# Patient Record
Sex: MALE | Race: WHITE | Employment: OTHER | URBAN - METROPOLITAN AREA
[De-identification: names, ages, dates, MRNs, and addresses within clinical notes are randomized per-mention and may not be internally consistent; named-entity substitution may affect disease eponyms.]

---

## 2017-06-08 ENCOUNTER — HOSPITAL ENCOUNTER (OUTPATIENT)
Dept: ULTRASOUND IMAGING | Age: 82
Discharge: HOME | End: 2017-06-08
Attending: RADIOLOGY
Payer: MEDICARE

## 2017-06-08 DIAGNOSIS — I65.29 CAROTID STENOSIS: ICD-10-CM

## 2017-06-08 PROCEDURE — 93880 EXTRACRANIAL BILAT STUDY: CPT

## 2017-09-12 ENCOUNTER — HOSPITAL ENCOUNTER (OUTPATIENT)
Dept: ULTRASOUND IMAGING | Age: 82
Discharge: HOME | End: 2017-09-12
Attending: INTERNAL MEDICINE
Payer: MEDICARE

## 2017-09-12 ENCOUNTER — HOSPITAL ENCOUNTER (OUTPATIENT)
Dept: MRI IMAGING | Age: 82
Discharge: HOME | End: 2017-09-12
Attending: INTERNAL MEDICINE
Payer: MEDICARE

## 2017-09-12 DIAGNOSIS — M54.41 CHRONIC RIGHT-SIDED LOW BACK PAIN WITH RIGHT-SIDED SCIATICA: ICD-10-CM

## 2017-09-12 DIAGNOSIS — M79.604 PAIN OF RIGHT LOWER EXTREMITY: ICD-10-CM

## 2017-09-12 DIAGNOSIS — G89.29 CHRONIC RIGHT-SIDED LOW BACK PAIN WITH RIGHT-SIDED SCIATICA: ICD-10-CM

## 2017-09-12 PROCEDURE — 93922 UPR/L XTREMITY ART 2 LEVELS: CPT

## 2017-09-12 PROCEDURE — 72148 MRI LUMBAR SPINE W/O DYE: CPT

## 2017-09-13 NOTE — PROGRESS NOTES
Pt notified that Refer to Ortho for low back pain. Patient requesting Dr. Christopher Garibaldi.  Referral ordered

## 2017-09-15 ENCOUNTER — HOSPITAL ENCOUNTER (OUTPATIENT)
Dept: ULTRASOUND IMAGING | Age: 82
Discharge: HOME | End: 2017-09-15
Attending: INTERNAL MEDICINE
Payer: MEDICARE

## 2017-09-15 DIAGNOSIS — I73.9 CLAUDICATION (HCC): ICD-10-CM

## 2017-09-15 PROCEDURE — 93926 LOWER EXTREMITY STUDY: CPT

## 2018-01-22 ENCOUNTER — HOSPITAL ENCOUNTER (OUTPATIENT)
Dept: LAB | Age: 83
Discharge: HOME | End: 2018-01-22
Payer: MEDICARE

## 2018-01-22 DIAGNOSIS — I50.22 CHRONIC SYSTOLIC HEART FAILURE (HCC): ICD-10-CM

## 2018-01-22 DIAGNOSIS — R53.83 FATIGUE, UNSPECIFIED TYPE: ICD-10-CM

## 2018-01-22 LAB
ALBUMIN SERPL-MCNC: 3.8 G/DL (ref 3.2–4.6)
ALBUMIN/GLOB SERPL: 1.2 {RATIO}
ALP SERPL-CCNC: 49 U/L (ref 50–136)
ALT SERPL-CCNC: 17 U/L (ref 12–65)
ANION GAP SERPL CALC-SCNC: 8 MMOL/L
AST SERPL-CCNC: 14 U/L (ref 15–37)
BASOPHILS # BLD: 0 K/UL (ref 0–0.2)
BASOPHILS NFR BLD: 1 % (ref 0–2)
BILIRUB SERPL-MCNC: 0.4 MG/DL (ref 0.2–1.1)
BUN SERPL-MCNC: 26 MG/DL (ref 8–23)
CALCIUM SERPL-MCNC: 8.7 MG/DL (ref 8.3–10.4)
CHLORIDE SERPL-SCNC: 109 MMOL/L (ref 98–107)
CO2 SERPL-SCNC: 23 MMOL/L (ref 21–32)
CREAT SERPL-MCNC: 1.6 MG/DL (ref 0.8–1.5)
DIFFERENTIAL METHOD BLD: ABNORMAL
EOSINOPHIL # BLD: 0.5 K/UL (ref 0–0.8)
EOSINOPHIL NFR BLD: 6 % (ref 0.5–7.8)
ERYTHROCYTE [DISTWIDTH] IN BLOOD BY AUTOMATED COUNT: 13.2 % (ref 11.9–14.6)
GLOBULIN SER CALC-MCNC: 3.3 G/DL
GLUCOSE SERPL-MCNC: 86 MG/DL (ref 65–100)
HCT VFR BLD AUTO: 34.8 % (ref 41.1–50.3)
HGB BLD-MCNC: 12 G/DL (ref 13.6–17.2)
LYMPHOCYTES # BLD: 1.4 K/UL (ref 0.5–4.6)
LYMPHOCYTES NFR BLD: 19 % (ref 13–44)
MCH RBC QN AUTO: 32.4 PG (ref 26.1–32.9)
MCHC RBC AUTO-ENTMCNC: 34.5 G/DL (ref 31.4–35)
MCV RBC AUTO: 94.1 FL (ref 79.6–97.8)
MONOCYTES # BLD: 0.9 K/UL (ref 0.1–1.3)
MONOCYTES NFR BLD: 11 % (ref 4–12)
NEUTS SEG # BLD: 4.8 K/UL (ref 1.7–8.2)
NEUTS SEG NFR BLD: 63 % (ref 43–78)
PLATELET # BLD AUTO: 242 K/UL (ref 150–450)
PMV BLD AUTO: 9.9 FL (ref 10.8–14.1)
POTASSIUM SERPL-SCNC: 4.3 MMOL/L (ref 3.5–5.1)
PROT SERPL-MCNC: 7.1 G/DL (ref 6.3–8.2)
RBC # BLD AUTO: 3.7 M/UL (ref 4.23–5.67)
SODIUM SERPL-SCNC: 140 MMOL/L (ref 136–145)
TSH SERPL DL<=0.005 MIU/L-ACNC: 2.28 UIU/ML (ref 0.36–3.74)
WBC # BLD AUTO: 7.6 K/UL (ref 4.3–11.1)

## 2018-01-22 PROCEDURE — 36415 COLL VENOUS BLD VENIPUNCTURE: CPT | Performed by: INTERNAL MEDICINE

## 2018-01-22 PROCEDURE — 84443 ASSAY THYROID STIM HORMONE: CPT | Performed by: INTERNAL MEDICINE

## 2018-01-22 PROCEDURE — 85025 COMPLETE CBC W/AUTO DIFF WBC: CPT | Performed by: INTERNAL MEDICINE

## 2018-01-22 PROCEDURE — 80053 COMPREHEN METABOLIC PANEL: CPT | Performed by: INTERNAL MEDICINE

## 2018-05-31 ENCOUNTER — HOSPITAL ENCOUNTER (OUTPATIENT)
Dept: ULTRASOUND IMAGING | Age: 83
Discharge: HOME | End: 2018-05-31
Attending: RADIOLOGY
Payer: MEDICARE

## 2018-05-31 DIAGNOSIS — I65.29 CAROTID STENOSIS: ICD-10-CM

## 2018-05-31 PROCEDURE — 93880 EXTRACRANIAL BILAT STUDY: CPT

## 2018-07-18 PROBLEM — I48.0 PAROXYSMAL ATRIAL FIBRILLATION (HCC): Status: ACTIVE | Noted: 2018-07-18

## 2018-11-05 PROBLEM — I48.19 PERSISTENT ATRIAL FIBRILLATION (HCC): Status: ACTIVE | Noted: 2018-07-18

## 2019-02-07 ENCOUNTER — HOSPITAL ENCOUNTER (OUTPATIENT)
Dept: MRI IMAGING | Age: 84
Discharge: HOME | End: 2019-02-07
Attending: INTERNAL MEDICINE
Payer: MEDICARE

## 2019-02-07 ENCOUNTER — HOSPITAL ENCOUNTER (EMERGENCY)
Age: 84
Discharge: HOME | End: 2019-02-07
Attending: STUDENT IN AN ORGANIZED HEALTH CARE EDUCATION/TRAINING PROGRAM
Payer: MEDICARE

## 2019-02-07 VITALS
OXYGEN SATURATION: 98 % | WEIGHT: 165 LBS | HEIGHT: 70 IN | SYSTOLIC BLOOD PRESSURE: 198 MMHG | TEMPERATURE: 97.8 F | DIASTOLIC BLOOD PRESSURE: 73 MMHG | BODY MASS INDEX: 23.62 KG/M2 | RESPIRATION RATE: 18 BRPM | HEART RATE: 71 BPM

## 2019-02-07 DIAGNOSIS — T78.40XA ALLERGIC REACTION, INITIAL ENCOUNTER: Primary | ICD-10-CM

## 2019-02-07 DIAGNOSIS — M54.40 LOW BACK PAIN WITH SCIATICA, SCIATICA LATERALITY UNSPECIFIED, UNSPECIFIED BACK PAIN LATERALITY, UNSPECIFIED CHRONICITY: ICD-10-CM

## 2019-02-07 PROCEDURE — 99283 EMERGENCY DEPT VISIT LOW MDM: CPT | Performed by: STUDENT IN AN ORGANIZED HEALTH CARE EDUCATION/TRAINING PROGRAM

## 2019-02-07 PROCEDURE — 74011000250 HC RX REV CODE- 250: Performed by: STUDENT IN AN ORGANIZED HEALTH CARE EDUCATION/TRAINING PROGRAM

## 2019-02-07 PROCEDURE — 72158 MRI LUMBAR SPINE W/O & W/DYE: CPT

## 2019-02-07 PROCEDURE — 74011250636 HC RX REV CODE- 250/636: Performed by: STUDENT IN AN ORGANIZED HEALTH CARE EDUCATION/TRAINING PROGRAM

## 2019-02-07 PROCEDURE — 74011250636 HC RX REV CODE- 250/636

## 2019-02-07 PROCEDURE — 93005 ELECTROCARDIOGRAM TRACING: CPT | Performed by: STUDENT IN AN ORGANIZED HEALTH CARE EDUCATION/TRAINING PROGRAM

## 2019-02-07 PROCEDURE — A9575 INJ GADOTERATE MEGLUMI 0.1ML: HCPCS

## 2019-02-07 PROCEDURE — 96375 TX/PRO/DX INJ NEW DRUG ADDON: CPT | Performed by: STUDENT IN AN ORGANIZED HEALTH CARE EDUCATION/TRAINING PROGRAM

## 2019-02-07 PROCEDURE — 96374 THER/PROPH/DIAG INJ IV PUSH: CPT | Performed by: STUDENT IN AN ORGANIZED HEALTH CARE EDUCATION/TRAINING PROGRAM

## 2019-02-07 RX ORDER — PREDNISONE 20 MG/1
40 TABLET ORAL DAILY
Qty: 8 TAB | Refills: 0 | Status: SHIPPED | OUTPATIENT
Start: 2019-02-07 | End: 2019-02-11

## 2019-02-07 RX ORDER — GADOTERATE MEGLUMINE 376.9 MG/ML
15 INJECTION INTRAVENOUS
Status: COMPLETED | OUTPATIENT
Start: 2019-02-07 | End: 2019-02-07

## 2019-02-07 RX ORDER — FAMOTIDINE 10 MG/ML
40 INJECTION INTRAVENOUS
Status: DISCONTINUED | OUTPATIENT
Start: 2019-02-07 | End: 2019-02-07 | Stop reason: SDUPTHER

## 2019-02-07 RX ORDER — SODIUM CHLORIDE 0.9 % (FLUSH) 0.9 %
10 SYRINGE (ML) INJECTION
Status: COMPLETED | OUTPATIENT
Start: 2019-02-07 | End: 2019-02-07

## 2019-02-07 RX ORDER — DIPHENHYDRAMINE HCL 25 MG
25 CAPSULE ORAL
Qty: 20 CAP | Refills: 0 | Status: SHIPPED | OUTPATIENT
Start: 2019-02-07 | End: 2019-02-17

## 2019-02-07 RX ORDER — DIPHENHYDRAMINE HYDROCHLORIDE 50 MG/ML
50 INJECTION, SOLUTION INTRAMUSCULAR; INTRAVENOUS
Status: COMPLETED | OUTPATIENT
Start: 2019-02-07 | End: 2019-02-07

## 2019-02-07 RX ADMIN — METHYLPREDNISOLONE SODIUM SUCCINATE 125 MG: 125 INJECTION, POWDER, FOR SOLUTION INTRAMUSCULAR; INTRAVENOUS at 19:52

## 2019-02-07 RX ADMIN — Medication 10 ML: at 19:16

## 2019-02-07 RX ADMIN — FAMOTIDINE 40 MG: 10 INJECTION INTRAVENOUS at 19:55

## 2019-02-07 RX ADMIN — GADOTERATE MEGLUMINE 15 ML: 376.9 INJECTION INTRAVENOUS at 19:16

## 2019-02-07 RX ADMIN — DIPHENHYDRAMINE HYDROCHLORIDE 50 MG: 50 INJECTION, SOLUTION INTRAMUSCULAR; INTRAVENOUS at 19:49

## 2019-02-08 LAB
ATRIAL RATE: 64 BPM
CALCULATED R AXIS, ECG10: -56 DEGREES
CALCULATED T AXIS, ECG11: 113 DEGREES
DIAGNOSIS, 93000: NORMAL
Q-T INTERVAL, ECG07: 456 MS
QRS DURATION, ECG06: 124 MS
QTC CALCULATION (BEZET), ECG08: 502 MS
VENTRICULAR RATE, ECG03: 73 BPM

## 2019-02-08 NOTE — ED NOTES
I have reviewed discharge instructions with the patient. The patient verbalized understanding. Patient left ED via Discharge Method: ambulatory to Home with self. Opportunity for questions and clarification provided. Patient given 2 scripts. To continue your aftercare when you leave the hospital, you may receive an automated call from our care team to check in on how you are doing. This is a free service and part of our promise to provide the best care and service to meet your aftercare needs.  If you have questions, or wish to unsubscribe from this service please call [phone]. Thank you for Choosing our [facility] Emergency Department.

## 2019-02-08 NOTE — ED PROVIDER NOTES
78-year-old male patient presents after a rapid response was called to MRI. Patient received an injection of contrast prior to his study. Shortly thereafter he developed some mild shortness of breath and significant neuritis effusively across his torso and back. Patient reports multiple medication allergies  He denies nausea, vomiting, pain at this time. Denies known contrast allergy. Patient arrived slightly stable, speaking clearly in no significant respiratory distress. Past Medical History:  
Diagnosis Date  Anemia 5/21/2013  Bruit 7/13/2016  CAD (coronary artery disease) 1999 CABG---- no mi/stents--- followed by dr. Dale Phillips  Carotid artery stenosis without cerebral infarction 7/13/2016 1. Right carotid stent August 2015.  CHF (congestive heart failure) ([address]  Chronic pain x yrs  
 lower back--  Chronic systolic heart failure ([facility] [address] 7/13/2016  Coronary atherosclerosis of native coronary vessel 7/13/2016  Depression 5/21/2013  Dermatitis 5/21/2013  Edema 7/13/2016  Extremity pain 7/13/2016  GERD (gastroesophageal reflux disease) occ-- no meds  Glaucoma  HLD (hyperlipidemia)  HTN (hypertension)   
 controlled with med  Lumbar stenosis with neurogenic claudication 4/15/2015  Mild aortic stenosis by prior echocardiogram   
 echo dated 5/7/14  Mitral valve regurgitation 7/13/2016  Peripheral vascular disease; arterial  7/13/2016  Pulmonary HTN, Secondary 7/13/2016  Pulmonary nodule 2012 Seen on CXR 2012. CT showed a calcified granuloma but no nodule.  Rash 7/13/2016  Spinal stenosis of thoracic region 7/13/2016  Syncope 7/6/2016 Past Surgical History:  
Procedure Laterality Date  CARDIAC SURG PROCEDURE UNLIST  12/1998  
 5 vessel bypass  HX APPENDECTOMY  HX BACK SURGERY  08/10/2018  HX CHOLECYSTECTOMY    
 open  HX GI    
 hemmrhoidectomy  HX HERNIA REPAIR    
 
   
 Family History:  
Problem Relation Age of Onset  Stroke Father  Heart Disease Brother  No Known Problems Mother Social History Socioeconomic History  Marital status:  Spouse name: Not on file  Number of children: Not on file  Years of education: Not on file  Highest education level: Not on file Social Needs  Financial resource strain: Not on file  Food insecurity - worry: Not on file  Food insecurity - inability: Not on file  Transportation needs - medical: Not on file  Transportation needs - non-medical: Not on file Occupational History  Not on file Tobacco Use  Smoking status: Former Smoker Packs/day: 4.00 Years: 15.00 Pack years: 60.00 Last attempt to quit: 1980 Years since quittin.1  Smokeless tobacco: Never Used Substance and Sexual Activity  Alcohol use: No  
 Drug use: No  
 Sexual activity: Not on file Other Topics Concern  Not on file Social History Narrative  Not on file ALLERGIES: Lumigan [bimatoprost]; Benzalkonium; Contrast dye [iodine]; Gadolinium-containing contrast media; Neosporin [neomycin-bacitracin-polymyxin]; and Other medication Review of Systems Constitutional: Negative for chills, diaphoresis and fever. HENT: Negative for congestion, sneezing and sore throat. Eyes: Negative for visual disturbance. Respiratory: Negative for cough, chest tightness, shortness of breath and wheezing. Cardiovascular: Negative for chest pain and leg swelling. Gastrointestinal: Negative for abdominal pain, blood in stool, diarrhea, nausea and vomiting. Endocrine: Negative for polyuria. Genitourinary: Negative for difficulty urinating, dysuria, flank pain, hematuria and urgency. Musculoskeletal: Negative for back pain, myalgias, neck pain and neck stiffness. Skin: Negative for color change and rash.   
Neurological: Negative for dizziness, syncope, speech difficulty, weakness, light-headedness, numbness and headaches. Psychiatric/Behavioral: Negative for behavioral problems. All other systems reviewed and are negative. Vitals:  
 02/07/19 1946 BP: 127/68 Pulse: 74 Resp: 20 Temp: 98.1 °F (36.7 °C) SpO2: 96% Weight: 74.8 kg (165 lb) Height: 5' 10\" (1.778 m) Physical Exam  
Constitutional: He is oriented to person, place, and time. He appears well-developed and well-nourished. No distress. Elderly male patient, Alert and oriented to person place and time. No acute distress, speaks in clear, fluid sentences. Controlling secretions without difficulty. HENT:  
Head: Normocephalic and atraumatic. Right Ear: External ear normal.  
Left Ear: External ear normal.  
Nose: Nose normal.  
Eyes: EOM are normal. Pupils are equal, round, and reactive to light. Neck: Normal range of motion. Cardiovascular: Normal rate, regular rhythm, normal heart sounds and intact distal pulses. Exam reveals no gallop and no friction rub. No murmur heard. Pulmonary/Chest: Effort normal and breath sounds normal. No respiratory distress. He has no wheezes. He has no rales. He exhibits no tenderness. Well-healed sternal surgical scar overlies the chest.  Lung sounds are clear and equal bilaterally. Abdominal: Soft. He exhibits no distension and no mass. There is no tenderness. There is no rebound and no guarding. No hernia. Musculoskeletal: Normal range of motion. He exhibits no edema, tenderness or deformity. Neurological: He is alert and oriented to person, place, and time. No cranial nerve deficit. Skin: Skin is warm and dry. Rash noted. Rash is urticarial. He is not diaphoretic. Faint urticarial rash present over patient's lower back and diffusely across his abdomen. The security nature per patient report. Nursing note and vitals reviewed. MDM Number of Diagnoses or Management Options Allergic reaction, initial encounter: new and requires workup Diagnosis management comments: EKG shows no acute abnormality. Atrial fibrillation present, rate controlled. History of same. Patient reports keratitis and slight shortness of breath that is now improved. He exhibits no findings consistent with anaphylactic type reaction. He was undergoing MRI imaging secondary to chronic back pain. He feels better after Pepcid, Benadryl and Solu-Medrol. Tolerating fluids without difficulty. Speaking in clear sentences, remained vitally stabl Patient is stable for discharge. We will provide prescription for Benadryl and prednisone. Discussed reasons to return with patient who voices understanding and agreement. Voice dictation software was used during the making of this note. This software is not perfect and grammatical and other typographical errors may be present. This note has been proofread, but may still contain errors. [address] [facility]; 2/7/2019 @9:37 PM  
=================================================================== Amount and/or Complexity of Data Reviewed Independent visualization of images, tracings, or specimens: yes Risk of Complications, Morbidity, and/or Mortality Presenting problems: low Diagnostic procedures: low Management options: low Patient Progress Patient progress: stable Procedures

## 2019-02-08 NOTE — ED TRIAGE NOTES
Pt was in MRI having scan done for back injury and had allergic reaction to contrast dye. No difficulty breathing or anaphylaxis noted at this time. Hives noted on torso. Pt seen in triage by Dr. Symone Gonzales.

## 2019-02-08 NOTE — DISCHARGE INSTRUCTIONS
Patient Education   if your symptoms return, begin the steroid prescribed. Control itching with Benadryl. Return if you develop any significant shortness of breath, nausea, vomiting or have any other concerns. Allergic Reaction: Care Instructions  Your Care Instructions    An allergic reaction is an excessive response from your immune system to a medicine, chemical, food, insect bite, or other substance. A reaction can range from mild to life-threatening. Some people have a mild rash, hives, and itching or stomach cramps. In severe reactions, swelling of your tongue and throat can close up your airway so that you cannot breathe. Follow-up care is a key part of your treatment and safety. Be sure to make and go to all appointments, and call your doctor if you are having problems. It's also a good idea to know your test results and keep a list of the medicines you take. How can you care for yourself at home? · If you know what caused your allergic reaction, be sure to avoid it. Your allergy may become more severe each time you have a reaction. · Take an over-the-counter antihistamine, such as cetirizine (Zyrtec) or loratadine (Claritin), to treat mild symptoms. Read and follow directions on the label. Some antihistamines can make you feel sleepy. Do not give antihistamines to a child unless you have checked with your doctor first. Mild symptoms include sneezing or an itchy or runny nose; an itchy mouth; a few hives or mild itching; and mild nausea or stomach discomfort. · Do not scratch hives or a rash. Put a cold, moist towel on them or take cool baths to relieve itching. Put ice packs on hives, swelling, or insect stings for 10 to 15 minutes at a time. Put a thin cloth between the ice pack and your skin. Do not take hot baths or showers. They will make the itching worse. · Your doctor may prescribe a shot of epinephrine to carry with you in case you have a severe reaction.  Learn how to give yourself the shot and keep it with you at all times. Make sure it is not . · Go to the emergency room every time you have a severe reaction, even if you have used your shot of epinephrine and are feeling better. Symptoms can come back after a shot. · Wear medical alert jewelry that lists your allergies. You can buy this at most drugstores. · If your child has a severe allergy, make sure that his or her teachers, babysitters, coaches, and other caregivers know about the allergy. They should have an epinephrine shot, know how and when to give it, and have a plan to take your child to the hospital.  When should you call for help? Give an epinephrine shot if:    · You think you are having a severe allergic reaction.     · You have symptoms in more than one body area, such as mild nausea and an itchy mouth.    After giving an epinephrine shot call 911, even if you feel better.   Call 911 if:    · You have symptoms of a severe allergic reaction. These may include:  ? Sudden raised, red areas (hives) all over your body. ? Swelling of the throat, mouth, lips, or tongue. ? Trouble breathing. ? Passing out (losing consciousness). Or you may feel very lightheaded or suddenly feel weak, confused, or restless.     · You have been given an epinephrine shot, even if you feel better.    Call your doctor now or seek immediate medical care if:    · You have symptoms of an allergic reaction, such as:  ? A rash or hives (raised, red areas on the skin). ? Itching. ? Swelling. ? Belly pain, nausea, or vomiting.    Watch closely for changes in your health, and be sure to contact your doctor if:    · You do not get better as expected. Where can you learn more? Go to http://juliana-poly.info/. Enter W105 in the search box to learn more about \"Allergic Reaction: Care Instructions. \"  Current as of: 2018  Content Version: 11.9  © 3984-1495 [employer].  Care instructions adapted under license by [employer] (which disclaims liability or warranty for this information). If you have questions about a medical condition or this instruction, always ask your healthcare professional. [address] any warranty or liability for your use of this information.

## 2019-06-15 ENCOUNTER — HOSPITAL ENCOUNTER (OUTPATIENT)
Dept: ULTRASOUND IMAGING | Age: 84
Discharge: HOME | End: 2019-06-15
Attending: RADIOLOGY
Payer: MEDICARE

## 2019-06-15 DIAGNOSIS — I65.29 CAROTID STENOSIS: ICD-10-CM

## 2019-06-15 PROCEDURE — 93880 EXTRACRANIAL BILAT STUDY: CPT

## 2019-07-12 ENCOUNTER — HOSPITAL ENCOUNTER (OUTPATIENT)
Dept: ULTRASOUND IMAGING | Age: 84
Discharge: HOME | End: 2019-07-12
Attending: INTERNAL MEDICINE
Payer: MEDICARE

## 2019-07-12 DIAGNOSIS — M79.605 PAIN OF LEFT LOWER EXTREMITY: ICD-10-CM

## 2019-07-12 PROCEDURE — 93925 LOWER EXTREMITY STUDY: CPT

## 2019-07-15 NOTE — PROGRESS NOTES
US did show evidence for a blockage in the arteries in his leg. Let's set him back up with Vascular Surgery for claudication.

## 2019-07-15 NOTE — PROGRESS NOTES
Spoke with Hossein Bloom at [facility]. Pt seen JESSICA Azevedo at that office 10/2017. Does not need a referral. Hossein Bloom is going to look at notes, schedule pt and call us back with date and time.

## 2019-07-22 PROBLEM — M54.9 BACK PAIN: Status: ACTIVE | Noted: 2019-07-22

## 2019-07-22 PROBLEM — M79.605 LEFT LEG PAIN: Status: ACTIVE | Noted: 2019-07-22

## 2019-11-01 ENCOUNTER — HOSPITAL ENCOUNTER (OUTPATIENT)
Dept: GENERAL RADIOLOGY | Age: 84
Discharge: HOME | End: 2019-11-01
Payer: MEDICARE

## 2019-11-01 DIAGNOSIS — M19.90 DJD (DEGENERATIVE JOINT DISEASE): ICD-10-CM

## 2019-11-01 PROCEDURE — 73523 X-RAY EXAM HIPS BI 5/> VIEWS: CPT

## 2020-02-20 PROBLEM — R97.20 ELEVATED PSA: Status: ACTIVE | Noted: 2020-02-20

## 2020-04-30 ENCOUNTER — HOSPITAL ENCOUNTER (EMERGENCY)
Age: 85
Discharge: HOME | End: 2020-04-30
Attending: EMERGENCY MEDICINE
Payer: MEDICARE

## 2020-04-30 VITALS
TEMPERATURE: 98.3 F | SYSTOLIC BLOOD PRESSURE: 164 MMHG | HEART RATE: 82 BPM | DIASTOLIC BLOOD PRESSURE: 69 MMHG | OXYGEN SATURATION: 99 % | RESPIRATION RATE: 16 BRPM

## 2020-04-30 DIAGNOSIS — M54.32 SCIATICA OF LEFT SIDE: Primary | ICD-10-CM

## 2020-04-30 PROCEDURE — 74011250636 HC RX REV CODE- 250/636: Performed by: EMERGENCY MEDICINE

## 2020-04-30 PROCEDURE — 99284 EMERGENCY DEPT VISIT MOD MDM: CPT

## 2020-04-30 PROCEDURE — 96372 THER/PROPH/DIAG INJ SC/IM: CPT

## 2020-04-30 PROCEDURE — 74011636637 HC RX REV CODE- 636/637: Performed by: EMERGENCY MEDICINE

## 2020-04-30 RX ORDER — KETOROLAC TROMETHAMINE 30 MG/ML
30 INJECTION, SOLUTION INTRAMUSCULAR; INTRAVENOUS
Status: COMPLETED | OUTPATIENT
Start: 2020-04-30 | End: 2020-04-30

## 2020-04-30 RX ORDER — KETOROLAC TROMETHAMINE 10 MG/1
10 TABLET, FILM COATED ORAL
Qty: 20 TAB | Refills: 0 | Status: SHIPPED | OUTPATIENT
Start: 2020-04-30

## 2020-04-30 RX ORDER — HYDROCODONE BITARTRATE AND ACETAMINOPHEN 5; 325 MG/1; MG/1
TABLET ORAL
COMMUNITY
Start: 2020-04-11

## 2020-04-30 RX ADMIN — KETOROLAC TROMETHAMINE 30 MG: 30 INJECTION, SOLUTION INTRAMUSCULAR at 13:56

## 2020-04-30 RX ADMIN — PREDNISONE 60 MG: 10 TABLET ORAL at 13:56

## 2020-04-30 NOTE — ED PROVIDER NOTES
Presents emerge department complaining of severe and intractable pain to the left knee. He also states that there is involving the left hip and radiating into the left leg. He has a longstanding history of chronic pain and is being seen by a pain management physician. He cannot remember his name however. He has been taking hydrocodone 7.5 mg.  He denies any falls or trauma and review of systems is otherwise negative. The history is provided by medical records and the patient. Knee Pain    This is a chronic problem. The current episode started more than 1 week ago. The problem occurs constantly. The problem has been gradually worsening. The pain is present in the left hip, left upper leg, left knee and left lower leg. The quality of the pain is described as aching. The pain is at a severity of 10/10. The pain is severe. Associated symptoms include back pain. Pertinent negatives include no numbness, full range of motion, no stiffness, no tingling, no itching and no neck pain. The symptoms are aggravated by standing and movement. Treatments tried: Hydrocodone 7.5 mg. The treatment provided no relief. There has been no history of extremity trauma. Past Medical History:   Diagnosis Date    Anemia 5/21/2013    Bruit 7/13/2016    CAD (coronary artery disease) 1999    CABG---- no mi/stents--- followed by dr. Devon Aguila    Carotid artery stenosis without cerebral infarction 7/13/2016    1. Right carotid stent August 2015.       CHF (congestive heart failure) (HCC)     Chronic pain x yrs    lower back--     Chronic systolic heart failure ([address] 7/13/2016    Coronary atherosclerosis of native coronary vessel 7/13/2016    Depression 5/21/2013    Dermatitis 5/21/2013    Edema 7/13/2016    Extremity pain 7/13/2016    GERD (gastroesophageal reflux disease)     occ-- no meds    Glaucoma     HLD (hyperlipidemia)     HTN (hypertension)     controlled with med    Lumbar stenosis with neurogenic claudication 4/15/2015    Mild aortic stenosis by prior echocardiogram     echo dated 14    Mitral valve regurgitation 2016    Peripheral vascular disease; arterial  2016    Pulmonary HTN, Secondary 2016    Pulmonary nodule     Seen on CXR . CT showed a calcified granuloma but no nodule.     Rash 2016    Spinal stenosis of thoracic region 2016    Syncope 2016       Past Surgical History:   Procedure Laterality Date    CARDIAC SURG PROCEDURE UNLIST  1998    5 vessel bypass     HX APPENDECTOMY      HX BACK SURGERY  08/10/2018    HX CHOLECYSTECTOMY      open    HX GI      hemmrhoidectomy    HX HERNIA REPAIR           Family History:   Problem Relation Age of Onset    Stroke Father     Heart Disease Brother     No Known Problems Mother        Social History     Socioeconomic History    Marital status:      Spouse name: Not on file    Number of children: Not on file    Years of education: Not on file    Highest education level: Not on file   Occupational History    Not on file   Social Needs    Financial resource strain: Not on file    Food insecurity     Worry: Not on file     Inability: Not on file    Transportation needs     Medical: Not on file     Non-medical: Not on file   Tobacco Use    Smoking status: Former Smoker     Packs/day: 4.00     Years: 15.00     Pack years: 60.00     Last attempt to quit: 1980     Years since quittin.3    Smokeless tobacco: Never Used   Substance and Sexual Activity    Alcohol use: No    Drug use: No    Sexual activity: Not on file   Lifestyle    Physical activity     Days per week: Not on file     Minutes per session: Not on file    Stress: Not on file   Relationships    Social connections     Talks on phone: Not on file     Gets together: Not on file     Attends Shinto service: Not on file     Active member of club or organization: Not on file     Attends meetings of clubs or organizations: Not on file     Relationship status: Not on file    Intimate partner violence     Fear of current or ex partner: Not on file     Emotionally abused: Not on file     Physically abused: Not on file     Forced sexual activity: Not on file   Other Topics Concern    Not on file   Social History Narrative    Not on file         ALLERGIES: Lumigan [bimatoprost]; Benzalkonium; Contrast dye [iodine]; Gadolinium-containing contrast media; Neosporin [neomycin-bacitracin-polymyxin]; and Other medication    Review of Systems   Musculoskeletal: Positive for back pain. Negative for neck pain and stiffness. Skin: Negative for itching. Neurological: Negative for tingling and numbness. All other systems reviewed and are negative. Vitals:    04/30/20 1313   BP: 168/73   Pulse: 80   Resp: 16   Temp: 98.3 °F (36.8 °C)   SpO2: 99%            Physical Exam  Vitals signs and nursing note reviewed. Constitutional:       General: He is not in acute distress. Appearance: Normal appearance. He is normal weight. He is not ill-appearing, toxic-appearing or diaphoretic. HENT:      Head: Normocephalic and atraumatic. Abdominal:      General: There is no distension. Tenderness: There is no abdominal tenderness. Musculoskeletal:         General: Tenderness present. No swelling, deformity or signs of injury. Right lower leg: No edema. Left lower leg: No edema. Comments: Tenderness is noted to the left lumbar and sacroiliac area as well as the left gluteus. Examination of left knee demonstrates normal range of motion, no crepitus and no joint effusion or warmth or erythema. Skin:     General: Skin is warm and dry. Capillary Refill: Capillary refill takes less than 2 seconds. Neurological:      General: No focal deficit present. Mental Status: He is alert and oriented to person, place, and time. Mental status is at baseline.    Psychiatric:         Mood and Affect: Mood normal. Behavior: Behavior normal.         Thought Content:  Thought content normal.          MDM  Number of Diagnoses or Management Options     Amount and/or Complexity of Data Reviewed  Review and summarize past medical records: yes    Risk of Complications, Morbidity, and/or Mortality  Presenting problems: low  Diagnostic procedures: minimal  Management options: low    Patient Progress  Patient progress: stable         Procedures

## 2020-04-30 NOTE — DISCHARGE INSTRUCTIONS
Patient Education        Sciatica: Care Instructions  Your Care Instructions    Sciatica (say \"fym-KC-ay-kuh\") is an irritation of one of the sciatic nerves, which come from the spinal cord in the lower back. The sciatic nerves and their branches extend down through the buttock to the foot. Sciatica can develop when an injured disc in the back presses against a spinal nerve root. Its main symptom is pain, numbness, or weakness that is often worse in the leg or foot than in the back. Sciatica often will improve and go away with time. Early treatment usually includes medicines and exercises to relieve pain. Follow-up care is a key part of your treatment and safety. Be sure to make and go to all appointments, and call your doctor if you are having problems. It's also a good idea to know your test results and keep a list of the medicines you take. How can you care for yourself at home? · Take pain medicines exactly as directed. ? If the doctor gave you a prescription medicine for pain, take it as prescribed. ? If you are not taking a prescription pain medicine, ask your doctor if you can take an over-the-counter medicine. · Use heat or ice to relieve pain. ? To apply heat, put a warm water bottle, heating pad set on low, or warm cloth on your back. Do not go to sleep with a heating pad on your skin. ? To use ice, put ice or a cold pack on the area for 10 to 20 minutes at a time. Put a thin cloth between the ice and your skin. · Avoid sitting if possible, unless it feels better than standing. · Alternate lying down with short walks. Increase your walking distance as you are able to without making your symptoms worse. · Do not do anything that makes your symptoms worse. When should you call for help? Call 911 anytime you think you may need emergency care.  For example, call if:    · You are unable to move a leg at all.   [facility] your doctor now or seek immediate medical care if:    · You have new or worse symptoms in your legs or buttocks. Symptoms may include:  ? Numbness or tingling. ? Weakness. ? Pain.     · You lose bladder or bowel control.    Watch closely for changes in your health, and be sure to contact your doctor if:    · You are not getting better as expected. Where can you learn more? Go to http://juliana-poly.info/  Enter Z239 in the search box to learn more about \"Sciatica: Care Instructions. \"  Current as of: June 26, 2019Content Version: 12.4  © 7079-1442 [employer]. Care instructions adapted under license by [employer] (which disclaims liability or warranty for this information). If you have questions about a medical condition or this instruction, always ask your healthcare professional. [address] any warranty or liability for your use of this information. Patient Education        Sciatica: Exercises  Introduction  Here are some examples of typical rehabilitation exercises for your condition. Start each exercise slowly. Ease off the exercise if you start to have pain. Your doctor or physical therapist will tell you when you can start these exercises and which ones will work best for you. When you are not being active, find a comfortable position for rest. Some people are comfortable on the floor or a medium-firm bed with a small pillow under their head and another under their knees. Some people prefer to lie on their side with a pillow between their knees. Don't stay in one position for too long. Take short walks (10 to 20 minutes) every 2 to 3 hours. Avoid slopes, hills, and stairs until you feel better. Walk only distances you can manage without pain, especially leg pain. How to do the exercises  Back stretches   1. Get down on your hands and knees on the floor. 2. Relax your head and allow it to droop. Round your back up toward the ceiling until you feel a nice stretch in your upper, middle, and lower back. Hold this stretch for as long as it feels comfortable, or about 15 to 30 seconds. 3. Return to the starting position with a flat back while you are on your hands and knees. 4. Let your back sway by pressing your stomach toward the floor. Lift your buttocks toward the ceiling. 5. Hold this position for 15 to 30 seconds. 6. Repeat 2 to 4 times. Follow-up care is a key part of your treatment and safety. Be sure to make and go to all appointments, and call your doctor if you are having problems. It's also a good idea to know your test results and keep a list of the medicines you take. Where can you learn more? Go to http://juliaan-poly.info/  Enter O253 in the search box to learn more about \"Sciatica: Exercises. \"  Current as of: June 26, 2019Content Version: 12.4  © 9921-3541 [employer]. Care instructions adapted under license by [employer] (which disclaims liability or warranty for this information). If you have questions about a medical condition or this instruction, always ask your healthcare professional. [address] any warranty or liability for your use of this information.

## 2020-04-30 NOTE — ED TRIAGE NOTES
Patient to ed via [facility] with mask in place on arrival. Patient complains of left knee pain. Patient denies any injury to knee and states he has been taking his norco 7.5 and naproxen without relief.

## 2020-04-30 NOTE — ED NOTES
I have reviewed discharge instructions with the patient. The patient verbalized understanding. Patient left ED via Discharge Method: wheelchair to Home with wife. Opportunity for questions and clarification provided. Patient given 1 scripts. No e-sign. To continue your aftercare when you leave the hospital, you may receive an automated call from our care team to check in on how you are doing. This is a free service and part of our promise to provide the best care and service to meet your aftercare needs.  If you have questions, or wish to unsubscribe from this service please call [phone]. Thank you for Choosing our [facility] Emergency Department.

## 2020-06-09 ENCOUNTER — HOSPITAL ENCOUNTER (OUTPATIENT)
Dept: ULTRASOUND IMAGING | Age: 85
Discharge: HOME | End: 2020-06-09
Attending: RADIOLOGY
Payer: MEDICARE

## 2020-06-09 DIAGNOSIS — I65.29 CAROTID STENOSIS: ICD-10-CM

## 2020-06-09 PROCEDURE — 93880 EXTRACRANIAL BILAT STUDY: CPT

## 2020-10-21 ENCOUNTER — TRANSCRIBE ORDER (OUTPATIENT)
Dept: SCHEDULING | Age: 85
End: 2020-10-21

## 2020-10-21 DIAGNOSIS — M54.50 LOW BACK PAIN: Primary | ICD-10-CM

## 2020-10-21 DIAGNOSIS — M54.6 THORACIC SPINE PAIN: ICD-10-CM

## 2020-11-17 ENCOUNTER — HOSPITAL ENCOUNTER (OUTPATIENT)
Dept: MRI IMAGING | Age: 85
Discharge: HOME | End: 2020-11-17
Attending: NEUROLOGICAL SURGERY
Payer: MEDICARE

## 2020-11-17 DIAGNOSIS — M54.6 THORACIC SPINE PAIN: ICD-10-CM

## 2020-11-17 DIAGNOSIS — M54.50 LOW BACK PAIN: ICD-10-CM

## 2020-11-17 PROCEDURE — 72146 MRI CHEST SPINE W/O DYE: CPT

## 2020-11-17 PROCEDURE — 72148 MRI LUMBAR SPINE W/O DYE: CPT

## 2021-07-20 ENCOUNTER — HOSPITAL ENCOUNTER (EMERGENCY)
Age: 86
Discharge: HOME | End: 2021-07-20
Attending: EMERGENCY MEDICINE
Payer: MEDICARE

## 2021-07-20 ENCOUNTER — APPOINTMENT (OUTPATIENT)
Dept: MRI IMAGING | Age: 86
End: 2021-07-20
Attending: PHYSICIAN ASSISTANT
Payer: MEDICARE

## 2021-07-20 VITALS
DIASTOLIC BLOOD PRESSURE: 68 MMHG | OXYGEN SATURATION: 100 % | TEMPERATURE: 97.9 F | RESPIRATION RATE: 16 BRPM | SYSTOLIC BLOOD PRESSURE: 133 MMHG | HEART RATE: 54 BPM
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
## 2021-07-20 DIAGNOSIS — M25.559 HIP PAIN: ICD-10-CM

## 2021-07-20 PROCEDURE — 99283 EMERGENCY DEPT VISIT LOW MDM: CPT

## 2021-07-20 PROCEDURE — 73721 MRI JNT OF LWR EXTRE W/O DYE: CPT

## 2021-07-20 NOTE — ED NOTES
Pt ambulatory to triage with cane and assistance. Reports fall on Saturday. Seen at urgent care and scans were negative but sent here to get a CT done for increased left hip pain. Pt has skin tear to bilateral arms and abrasion to top of head.

## 2021-07-20 NOTE — ED NOTES
Patient report to [facility]. Patient care to be assumed at this time. Patient currently located in [facility].

## 2021-07-20 NOTE — ED PROVIDER NOTES
Patient is here with left hip pain and inability to ambulate. He was \"building a building on Saturday and a piece of plywood fell over and knocked him over. \"  He landed on the left hip. He has used a cane to help him get around because he cannot bear weight on it. He went to [facility] today where they did x-rays that were normal and sent him here for further imaging. He is not having any chest pain, shortness of breath, abdominal pain, dizziness, weakness, swelling/tingling or weakness to his arms or legs or other new symptoms. He did not hit his head nor did he have any loss of consciousness. His wife brought him to [facility].  Patient states that he had skin tears that were cleaned and dressed at [facility].    The history is provided by the patient. Hip Pain   This is a new problem. The current episode started more than 2 days ago. The problem occurs constantly. The problem has been gradually worsening. The pain is present in the left hip. The quality of the pain is described as aching. The pain is at a severity of 10/10. The pain is severe. Associated symptoms include limited range of motion and stiffness. Pertinent negatives include no numbness, no tingling, no itching, no back pain and no neck pain. The symptoms are aggravated by activity and standing. He has tried nothing for the symptoms. There has been a history of trauma. Past Medical History:   Diagnosis Date    Anemia 5/21/2013    Bruit 7/13/2016    CAD (coronary artery disease) 1999    CABG---- no mi/stents--- followed by dr. Meliza Lira    Carotid artery stenosis without cerebral infarction 7/13/2016    1. Right carotid stent August 2015.       CHF (congestive heart failure) ([facility])     Chronic pain x yrs    lower back--     Chronic systolic heart failure ([address] 7/13/2016    Coronary atherosclerosis of native coronary vessel 7/13/2016    Depression 5/21/2013    Dermatitis 5/21/2013    Edema 7/13/2016    Extremity pain 2016    GERD (gastroesophageal reflux disease)     occ-- no meds    Glaucoma     HLD (hyperlipidemia)     HTN (hypertension)     controlled with med    Lumbar stenosis with neurogenic claudication 4/15/2015    Mild aortic stenosis by prior echocardiogram     echo dated 14    Mitral valve regurgitation 2016    Peripheral vascular disease; arterial  2016    Pulmonary HTN, Secondary 2016    Pulmonary nodule 2012    Seen on CXR . CT showed a calcified granuloma but no nodule.  Rash 2016    Spinal stenosis of thoracic region 2016    Syncope 2016       Past Surgical History:   Procedure Laterality Date    HX APPENDECTOMY      HX BACK SURGERY  08/10/2018    HX CHOLECYSTECTOMY      open    HX GI      hemmrhoidectomy    HX HERNIA REPAIR      MI CARDIAC SURG PROCEDURE UNLIST  1998    5 vessel bypass          Family History:   Problem Relation Age of Onset    Stroke Father     Heart Disease Brother     No Known Problems Mother        Social History     Socioeconomic History    Marital status:      Spouse name: Not on file    Number of children: Not on file    Years of education: Not on file    Highest education level: Not on file   Occupational History    Not on file   Tobacco Use    Smoking status: Former Smoker     Packs/day: 4.00     Years: 15.00     Pack years: 60.00     Quit date: 1980     Years since quittin.5    Smokeless tobacco: Never Used   Substance and Sexual Activity    Alcohol use: No    Drug use: No    Sexual activity: Not on file   Other Topics Concern    Not on file   Social History Narrative    Not on file     Social Determinants of Health     Financial Resource Strain:     Difficulty of Paying Living Expenses:    Food Insecurity:     Worried About Running Out of Food in the Last Year:     Ran Out of Food in the Last Year:    Transportation Needs:     Lack of Transportation (Medical):      Lack of Transportation (Non-Medical):    Physical Activity:     Days of Exercise per Week:     Minutes of Exercise per Session:    Stress:     Feeling of Stress :    Social Connections:     Frequency of Communication with Friends and Family:     Frequency of Social Gatherings with Friends and Family:     Attends Voodoo Services:     Active Member of Clubs or Organizations:     Attends Club or Organization Meetings:     Marital Status:    Intimate Partner Violence:     Fear of Current or Ex-Partner:     Emotionally Abused:     Physically Abused:     Sexually Abused: ALLERGIES: Lumigan [bimatoprost], Benzalkonium, Contrast dye [iodine], Gadolinium-containing contrast media, Neosporin [neomycin-bacitracin-polymyxin], and Other medication    Review of Systems   Constitutional: Negative. HENT: Negative. Eyes: Negative. Respiratory: Negative. Cardiovascular: Negative. Gastrointestinal: Negative. Genitourinary: Negative. Musculoskeletal: Positive for stiffness. Negative for back pain and neck pain. Left hip pain   Skin: Negative. Negative for itching. Neurological: Negative. Negative for tingling and numbness. Psychiatric/Behavioral: Negative. All other systems reviewed and are negative. Vitals:    07/20/21 1328   BP: (!) 114/53   Pulse: 63   Resp: 17   Temp: 97.5 °F (36.4 °C)   SpO2: 100%            Physical Exam  Vitals and nursing note reviewed. Constitutional:       Appearance: He is well-developed. HENT:      Head: Normocephalic and atraumatic. Right Ear: External ear normal.      Left Ear: External ear normal.      Nose: Nose normal.      Mouth/Throat:      Mouth: Mucous membranes are moist.   Eyes:      Conjunctiva/sclera: Conjunctivae normal.      Pupils: Pupils are equal, round, and reactive to light. Cardiovascular:      Rate and Rhythm: Normal rate and regular rhythm. Pulses: Normal pulses. Heart sounds: Normal heart sounds. Pulmonary:      Effort: Pulmonary effort is normal.      Breath sounds: Normal breath sounds. Abdominal:      General: Bowel sounds are normal.      Palpations: Abdomen is soft. Musculoskeletal:      Cervical back: Normal range of motion and neck supple. Left hip: Tenderness present. Legs:    Skin:     General: Skin is warm and dry. Capillary Refill: Capillary refill takes less than 2 seconds. Neurological:      General: No focal deficit present. Mental Status: He is alert and oriented to person, place, and time. Mental status is at baseline. Deep Tendon Reflexes: Reflexes are normal and symmetric. Psychiatric:         Mood and Affect: Mood normal.         Behavior: Behavior normal.         Thought Content: Thought content normal.         Judgment: Judgment normal.          MDM  Number of Diagnoses or Management Options  Fall, initial encounter  Hip pain  Diagnosis management comments: No acute fracture on left hip MRI. Will discharge. Has pain management for pain. Amount and/or Complexity of Data Reviewed  Tests in the radiology section of CPT®: ordered  Discuss the patient with other providers: yes (Dr. Solitario Flores)    Risk of Complications, Morbidity, and/or Mortality  Presenting problems: moderate  Diagnostic procedures: moderate  Management options: moderate           Procedures    2:59 PM Perfect served JESSICA Shine with Ortho regarding patient. 3:00 PM Jamila LOPEZ would prefer a MRI left hip. That was ordered. 4:20 PM Spoke with Dr. Solitario Flores regarding patient. He will assume care of patient at shift change. MRI HIP LT WO CONT (Preliminary result)  Result time 07/20/21 20:16:20  ED Interpretation by Kilo Rosario MD (07/20/21 20:16:20, [facility], Radiology)    Left gluteal hematoma. No definite fracture.    Final report t/f from [facility].

## 2021-07-20 NOTE — ED NOTES
Patient has been provided meal tray while awaiting MRI. Patient appears in no acute distress, denies having any needs at this time.

## 2021-07-21 NOTE — ED NOTES
I have reviewed discharge instructions with the patient and family member. The patient and family member verbalized understanding. Patient left ED via Discharge Method: wheelchair to Home with family member    Opportunity for questions and clarification provided. Patient given 0 scripts. To continue your aftercare when you leave the hospital, you may receive an automated call from our care team to check in on how you are doing. This is a free service and part of our promise to provide the best care and service to meet your aftercare needs.  If you have questions, or wish to unsubscribe from this service please call [phone]. Thank you for Choosing our [facility] Emergency Department.

## 2021-12-13 ENCOUNTER — HOSPITAL ENCOUNTER (OUTPATIENT)
Dept: LAB | Age: 86
Discharge: HOME | End: 2021-12-13
Payer: MEDICARE

## 2021-12-13 DIAGNOSIS — I10 ESSENTIAL HYPERTENSION: ICD-10-CM

## 2021-12-13 DIAGNOSIS — I48.19 PERSISTENT ATRIAL FIBRILLATION (HCC): ICD-10-CM

## 2021-12-13 LAB
ALBUMIN SERPL-MCNC: 3.6 G/DL (ref 3.2–4.6)
ALBUMIN/GLOB SERPL: 1.1 {RATIO} (ref 1.2–3.5)
ALP SERPL-CCNC: 64 U/L (ref 50–136)
ALT SERPL-CCNC: 23 U/L (ref 12–65)
ANION GAP SERPL CALC-SCNC: 7 MMOL/L (ref 7–16)
AST SERPL-CCNC: 18 U/L (ref 15–37)
BASOPHILS # BLD: 0.1 K/UL (ref 0–0.2)
BASOPHILS NFR BLD: 1 % (ref 0–2)
BILIRUB SERPL-MCNC: 0.7 MG/DL (ref 0.2–1.1)
BUN SERPL-MCNC: 23 MG/DL (ref 8–23)
CALCIUM SERPL-MCNC: 8.8 MG/DL (ref 8.3–10.4)
CHLORIDE SERPL-SCNC: 112 MMOL/L (ref 98–107)
CO2 SERPL-SCNC: 20 MMOL/L (ref 21–32)
CREAT SERPL-MCNC: 1.5 MG/DL (ref 0.8–1.5)
DIFFERENTIAL METHOD BLD: ABNORMAL
EOSINOPHIL # BLD: 0.5 K/UL (ref 0–0.8)
EOSINOPHIL NFR BLD: 6 % (ref 0.5–7.8)
ERYTHROCYTE [DISTWIDTH] IN BLOOD BY AUTOMATED COUNT: 13.6 % (ref 11.9–14.6)
GLOBULIN SER CALC-MCNC: 3.2 G/DL (ref 2.3–3.5)
GLUCOSE SERPL-MCNC: 104 MG/DL (ref 65–100)
HCT VFR BLD AUTO: 35.6 % (ref 41.1–50.3)
HGB BLD-MCNC: 11.7 G/DL (ref 13.6–17.2)
IMM GRANULOCYTES # BLD AUTO: 0 K/UL (ref 0–0.5)
IMM GRANULOCYTES NFR BLD AUTO: 0 % (ref 0–5)
LYMPHOCYTES # BLD: 1 K/UL (ref 0.5–4.6)
LYMPHOCYTES NFR BLD: 13 % (ref 13–44)
MCH RBC QN AUTO: 30.3 PG (ref 26.1–32.9)
MCHC RBC AUTO-ENTMCNC: 32.9 G/DL (ref 31.4–35)
MCV RBC AUTO: 92.2 FL (ref 79.6–97.8)
MONOCYTES # BLD: 0.7 K/UL (ref 0.1–1.3)
MONOCYTES NFR BLD: 9 % (ref 4–12)
NEUTS SEG # BLD: 5.5 K/UL (ref 1.7–8.2)
NEUTS SEG NFR BLD: 72 % (ref 43–78)
NRBC # BLD: 0 K/UL (ref 0–0.2)
PLATELET # BLD AUTO: 200 K/UL (ref 150–450)
PMV BLD AUTO: 10.4 FL (ref 9.4–12.3)
POTASSIUM SERPL-SCNC: 4.3 MMOL/L (ref 3.5–5.1)
PROT SERPL-MCNC: 6.8 G/DL (ref 6.3–8.2)
RBC # BLD AUTO: 3.86 M/UL (ref 4.23–5.6)
SODIUM SERPL-SCNC: 139 MMOL/L (ref 136–145)
WBC # BLD AUTO: 7.7 K/UL (ref 4.3–11.1)

## 2021-12-13 PROCEDURE — 36415 COLL VENOUS BLD VENIPUNCTURE: CPT

## 2021-12-13 PROCEDURE — 85025 COMPLETE CBC W/AUTO DIFF WBC: CPT

## 2021-12-13 PROCEDURE — 80053 COMPREHEN METABOLIC PANEL: CPT

## 2022-03-07 NOTE — PROGRESS NOTES
Can we send his MRI result to his back surgeon?
LVM to return call
Spoke with patient to find out the name of his current back surgeon in order to send MRI results to, pt stated that he doesn't want it sent to anyone just wants an copy for himself.
83

## 2022-03-18 PROBLEM — I48.19 PERSISTENT ATRIAL FIBRILLATION (HCC): Status: ACTIVE | Noted: 2018-07-18

## 2022-03-19 PROBLEM — M54.9 BACK PAIN: Status: ACTIVE | Noted: 2019-07-22

## 2022-03-19 PROBLEM — R97.20 ELEVATED PSA: Status: ACTIVE | Noted: 2020-02-20

## 2022-03-19 PROBLEM — M79.605 LEFT LEG PAIN: Status: ACTIVE | Noted: 2019-07-22

## 2022-06-30 ENCOUNTER — OFFICE VISIT (OUTPATIENT)
Dept: CARDIOLOGY CLINIC | Age: 87
End: 2022-06-30
Payer: MEDICARE

## 2022-06-30 VITALS
WEIGHT: 135.8 LBS | BODY MASS INDEX: 20.58 KG/M2 | DIASTOLIC BLOOD PRESSURE: 82 MMHG | HEART RATE: 72 BPM | SYSTOLIC BLOOD PRESSURE: 138 MMHG | HEIGHT: 68 IN

## 2022-06-30 DIAGNOSIS — I50.22 CHRONIC SYSTOLIC HEART FAILURE (HCC): ICD-10-CM

## 2022-06-30 DIAGNOSIS — I35.0 NONRHEUMATIC AORTIC VALVE STENOSIS: ICD-10-CM

## 2022-06-30 DIAGNOSIS — I48.19 PERSISTENT ATRIAL FIBRILLATION (HCC): Primary | ICD-10-CM

## 2022-06-30 DIAGNOSIS — I10 ESSENTIAL HYPERTENSION: ICD-10-CM

## 2022-06-30 DIAGNOSIS — I25.10 ATHEROSCLEROSIS OF NATIVE CORONARY ARTERY OF NATIVE HEART WITHOUT ANGINA PECTORIS: ICD-10-CM

## 2022-06-30 PROCEDURE — 1036F TOBACCO NON-USER: CPT | Performed by: INTERNAL MEDICINE

## 2022-06-30 PROCEDURE — 99214 OFFICE O/P EST MOD 30 MIN: CPT | Performed by: INTERNAL MEDICINE

## 2022-06-30 PROCEDURE — 1123F ACP DISCUSS/DSCN MKR DOCD: CPT | Performed by: INTERNAL MEDICINE

## 2022-06-30 PROCEDURE — G8427 DOCREV CUR MEDS BY ELIG CLIN: HCPCS | Performed by: INTERNAL MEDICINE

## 2022-06-30 PROCEDURE — G8420 CALC BMI NORM PARAMETERS: HCPCS | Performed by: INTERNAL MEDICINE

## 2022-06-30 ASSESSMENT — ENCOUNTER SYMPTOMS
BACK PAIN: 1
SHORTNESS OF BREATH: 0

## 2022-06-30 NOTE — PROGRESS NOTES
[address], [address] [place] [address], [address] [place] [address]  PHONE: Janes Farr [address]  1935      SUBJECTIVE:   Melissa Lagos is a 80 y.o. male seen for a follow up visit regarding the following:     Chief Complaint   Patient presents with    Irregular Heart Beat    Hypertension    Congestive Heart Failure    Coronary Artery Disease       HPI:    Patient presents for follow-up. He remains quite limited due to his back pain. Has lost weight. States he eats [employer] horse\". No chest pain. Occasional epigastric discomfort which last 1 to 2 minutes. Appears atypical for angina. Denies any PND orthopnea. Has stable dyspnea on exertion but has a low functional capacity due to his back pain. Past Medical History, Past Surgical History, Family history, Social History, and Medications were all reviewed with the patient today and updated as necessary. Current Outpatient Medications:     aspirin 81 MG EC tablet, Take 81 mg by mouth daily, Disp: , Rfl:     ketorolac (TORADOL) 10 MG tablet, Take 10 mg by mouth every 6 hours as needed, Disp: , Rfl:     latanoprost (XALATAN) 0.005 % ophthalmic solution, Apply 1 drop to eye, Disp: , Rfl:     lisinopril (PRINIVIL;ZESTRIL) 20 MG tablet, Take 20 mg by mouth daily, Disp: , Rfl:     nitroGLYCERIN (NITROSTAT) 0.4 MG SL tablet, Place 0.4 mg under the tongue, Disp: , Rfl:      Allergies   Allergen Reactions    Bimatoprost Anaphylaxis     And skinned peeled    Benzalkonium Other (See Comments)    Iodine Hives        Patient Active Problem List    Diagnosis Date Noted    Elevated PSA 02/20/2020     Priority: Low    Back pain 07/22/2019     Priority: Low    Left leg pain 07/22/2019     Priority: Low    Persistent atrial fibrillation ([address] 07/18/2018     Priority: Low     UNABLE TO TOLERATE ANTICOAGULATION  1. Asymptomatic.   Diagnosed in office on EKG 7/18/17        Lumbar stenosis with neurogenic claudication 09/28/2016     Priority: Low    Carotid artery stenosis without cerebral infarction 09/28/2016     Priority: Low     Prior Right Carotid stent. 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 CABG in 12/98:   5 vessel.  Peripheral vascular disease ([address] 07/13/2016     Priority: Low     1. Duplex of LE (10/8/13): Moderate to severe left distal SFA and proximal   PFA stenosis. Diffuse calcification of the right leg vasculature. Resultant acoustic shadowing. Right NERI - 1.09  Left NERI - 0.58        Chronic systolic heart failure ([address] 07/13/2016     Priority: Low     PLEASE DO NOT DELETE  1.  Echo (7/27/12):  EF 40-45%. Paradoxical septal motion. Pseudo-normal   pattern of diastolic dysfunction. Normal RV function. Mild atrial   enlargement. Moderate mitral and tricuspid regurgitation. Moderate   pulmonary hypertension. RVSP 52.  2.  COMFORT (5/13):  EF 50-55%. Moderate LAE. Moderate mitral and tricuspid   regurgitation. 3. Echo (7/7/16):  EF 55%. Grade 1 DD. Mild aortic stenosis. MG 13. Mild to moderate mitral regurgitation. 4.  Echo (7/11/17):  EF 55-60%. Abnormal diastolic dysfunction. Mild AS. MG 14.  PG 25. Mild mitral regurgitation. 5.  Echo (10/29/18):  EF 55-60%. Mild AS. Peak AV velocity 1.86. PG 14. Mild to moderate MR.  Moderate TR.  RVSP 36.   6.  Echo (5/4/20):  EF 55-60%. Severe LAE. Moderate NATALIIA.  AVSC. Severe   eccentric MR. Mild to moderate TR.  Syncope 2016     Priority: Low     1. Admitted 2016 with syncope. Felt due to micturation syncope. Noted to have Type I second degree AV block. Coreg discontinued. Social History     Tobacco Use    Smoking status: Former Smoker     Packs/day: 4.00     Quit date: 1980     Years since quittin.5    Smokeless tobacco: Never Used   Substance Use Topics    Alcohol use: No       ROS:    Review of Systems   Constitutional: Negative for malaise/fatigue. Cardiovascular: Positive for dyspnea on exertion. Negative for chest pain. Respiratory: Negative for shortness of breath. Musculoskeletal: Positive for arthritis and back pain. Neurological: Negative for focal weakness. Psychiatric/Behavioral: Negative for depression. PHYSICAL EXAM:  Wt Readings from Last 3 Encounters:   22 135 lb 12.8 oz (61.6 kg)   21 148 lb 6.4 oz (67.3 kg)   21 151 lb 3.2 oz (68.6 kg)     BP Readings from Last 3 Encounters:   22 138/82   21 (!) 146/82   21 132/70     Pulse Readings from Last 3 Encounters:   22 72   21 66   21 84       Physical Exam  Constitutional:       General: He is not in acute distress. Neck:      Vascular: No carotid bruit. Cardiovascular:      Rate and Rhythm: Normal rate. Rhythm irregular. Heart sounds: Murmur heard. Pulmonary:      Effort: No respiratory distress. Breath sounds: Normal breath sounds. Abdominal:      General: Bowel sounds are normal.      Palpations: Abdomen is soft. Musculoskeletal:         General: No swelling. Skin:     General: Skin is warm and dry. Neurological:      General: No focal deficit present. Psychiatric:         Mood and Affect: Mood normal.         Medical problems and test results were reviewed with the patient today.        Lab Results   Component Value Date WBC 7.7 12/13/2021    HGB 11.7 (L) 12/13/2021    HCT 35.6 (L) 12/13/2021    MCV 92.2 12/13/2021     12/13/2021       Lab Results   Component Value Date/Time     12/13/2021 10:48 AM    K 4.3 12/13/2021 10:48 AM     12/13/2021 10:48 AM    CO2 20 12/13/2021 10:48 AM    BUN 23 12/13/2021 10:48 AM    CREATININE 1.50 12/13/2021 10:48 AM    GLUCOSE 104 12/13/2021 10:48 AM    CALCIUM 8.8 12/13/2021 10:48 AM        No results found for: CHOL  No results found for: TRIG  No results found for: HDL  No results found for: LDLCHOLESTEROL, LDLCALC  No results found for: LABVLDL, VLDL  No results found for: CHOLHDLRATIO     Data from outside records/labs from outside providers have been reviewed and summarized as noted in the HPI, past history and data review sections of this note       ASSESSMENT and PLAN      1. Persistent atrial fibrillation (HCC)  Rate is controlled. Unable to tolerate anticoagulation. Very sensitive to medications. Continue aspirin. 2. Chronic systolic heart failure (HCC)  Mild LV dysfunction. On lisinopril. Unable to tolerate beta-blocker therapy. 3. Essential hypertension  Blood pressure controlled on lisinopril. Continue this medication. 4. Atherosclerosis of native coronary artery of native heart without angina pectoris  No angina. Continue aspirin. Unable to tolerate statin therapy. 5. Nonrheumatic aortic valve stenosis  Poor candidate for any intervention given debility and age. Marianne Mtz MD  6/30/2022  1:57 PM    This note may have been dictated using speech recognition software.   As a result, error of speech recognition may have occurred

## 2023-01-05 ENCOUNTER — OFFICE VISIT (OUTPATIENT)
Dept: CARDIOLOGY CLINIC | Age: 88
End: 2023-01-05
Payer: MEDICARE

## 2023-01-05 VITALS
HEART RATE: 62 BPM | SYSTOLIC BLOOD PRESSURE: 110 MMHG | DIASTOLIC BLOOD PRESSURE: 52 MMHG | BODY MASS INDEX: 20.13 KG/M2 | HEIGHT: 68 IN | WEIGHT: 132.8 LBS

## 2023-01-05 DIAGNOSIS — N18.30 STAGE 3 CHRONIC KIDNEY DISEASE, UNSPECIFIED WHETHER STAGE 3A OR 3B CKD (HCC): ICD-10-CM

## 2023-01-05 DIAGNOSIS — I48.19 PERSISTENT ATRIAL FIBRILLATION (HCC): ICD-10-CM

## 2023-01-05 DIAGNOSIS — R63.4 WEIGHT LOSS: ICD-10-CM

## 2023-01-05 DIAGNOSIS — I48.19 PERSISTENT ATRIAL FIBRILLATION (HCC): Primary | ICD-10-CM

## 2023-01-05 DIAGNOSIS — I10 ESSENTIAL HYPERTENSION: ICD-10-CM

## 2023-01-05 DIAGNOSIS — I25.10 ATHEROSCLEROSIS OF NATIVE CORONARY ARTERY OF NATIVE HEART WITHOUT ANGINA PECTORIS: ICD-10-CM

## 2023-01-05 LAB
ANION GAP SERPL CALC-SCNC: 8 MMOL/L (ref 2–11)
BUN SERPL-MCNC: 28 MG/DL (ref 8–23)
CALCIUM SERPL-MCNC: 9 MG/DL (ref 8.3–10.4)
CHLORIDE SERPL-SCNC: 106 MMOL/L (ref 101–110)
CO2 SERPL-SCNC: 23 MMOL/L (ref 21–32)
CREAT SERPL-MCNC: 1.5 MG/DL (ref 0.8–1.5)
ERYTHROCYTE [DISTWIDTH] IN BLOOD BY AUTOMATED COUNT: 14.2 % (ref 11.9–14.6)
GLUCOSE SERPL-MCNC: 74 MG/DL (ref 65–100)
HCT VFR BLD AUTO: 34.8 % (ref 41.1–50.3)
HGB BLD-MCNC: 11 G/DL (ref 13.6–17.2)
MCH RBC QN AUTO: 31.3 PG (ref 26.1–32.9)
MCHC RBC AUTO-ENTMCNC: 31.6 G/DL (ref 31.4–35)
MCV RBC AUTO: 98.9 FL (ref 82–102)
NRBC # BLD: 0 K/UL (ref 0–0.2)
PLATELET # BLD AUTO: 238 K/UL (ref 150–450)
PMV BLD AUTO: 10.4 FL (ref 9.4–12.3)
POTASSIUM SERPL-SCNC: 4.5 MMOL/L (ref 3.5–5.1)
RBC # BLD AUTO: 3.52 M/UL (ref 4.23–5.6)
SODIUM SERPL-SCNC: 137 MMOL/L (ref 133–143)
TSH, 3RD GENERATION: 4.06 UIU/ML (ref 0.36–3.74)
WBC # BLD AUTO: 6.6 K/UL (ref 4.3–11.1)

## 2023-01-05 PROCEDURE — G8420 CALC BMI NORM PARAMETERS: HCPCS | Performed by: INTERNAL MEDICINE

## 2023-01-05 PROCEDURE — G8484 FLU IMMUNIZE NO ADMIN: HCPCS | Performed by: INTERNAL MEDICINE

## 2023-01-05 PROCEDURE — 99214 OFFICE O/P EST MOD 30 MIN: CPT | Performed by: INTERNAL MEDICINE

## 2023-01-05 PROCEDURE — 1123F ACP DISCUSS/DSCN MKR DOCD: CPT | Performed by: INTERNAL MEDICINE

## 2023-01-05 PROCEDURE — 1036F TOBACCO NON-USER: CPT | Performed by: INTERNAL MEDICINE

## 2023-01-05 PROCEDURE — G8427 DOCREV CUR MEDS BY ELIG CLIN: HCPCS | Performed by: INTERNAL MEDICINE

## 2023-01-05 ASSESSMENT — ENCOUNTER SYMPTOMS
SHORTNESS OF BREATH: 0
BACK PAIN: 1

## 2023-01-05 NOTE — PROGRESS NOTES
[address], [address] [place] [address], [address] [place] [address]  PHONE: Janes Farr  LEEANNE Redmond  1935      SUBJECTIVE:   Kinsey Lott is a 80 y.o. male seen for a follow up visit regarding the following:     Chief Complaint   Patient presents with    Atrial Fibrillation    Congestive Heart Failure    Coronary Artery Disease    Hypertension       HPI:    Kinsey Lott presents for routine follow up for known Coronary Artery Disease. Multiple issues addressed as outlined below:     Coronary Artery Disease:  Patient denies any recent angina. Notes compliance with medical therapy. No recent NTG use. Persistent atrial fibrillation: Denies palpitation or tachycardia. No neurologic symptoms consistent with TIA or stroke. Hypertension:  Ambulatory BP readings have been controlled. Patient reports compliance with medical therapy without side effects. Hyperlipidemia:   Unable to take statins. Back pain: Remains his largest limitation. Poorly controlled. No recent labs. Weight loss: Continues to lose weight. Has lost 13 pounds since last visit          Past Medical History, Past Surgical History, Family history, Social History, and Medications were all reviewed with the patient today and updated as necessary.            Current Outpatient Medications:     ketorolac (TORADOL) 10 MG tablet, Take 10 mg by mouth every 6 hours as needed, Disp: , Rfl:     latanoprost (XALATAN) 0.005 % ophthalmic solution, Apply 1 drop to eye, Disp: , Rfl:     nitroGLYCERIN (NITROSTAT) 0.4 MG SL tablet, Place 0.4 mg under the tongue, Disp: , Rfl:     aspirin 81 MG EC tablet, Take 81 mg by mouth daily (Patient not taking: Reported on 1/5/2023), Disp: , Rfl:     lisinopril (PRINIVIL;ZESTRIL) 20 MG tablet, Take 20 mg by mouth daily (Patient not taking: Reported on 1/5/2023), Disp: , Rfl:      Allergies   Allergen Reactions    Bimatoprost Anaphylaxis     And skinned peeled    Benzalkonium Other (See Comments)    Iodine Hives        Patient Active Problem List    Diagnosis Date Noted    Elevated PSA 02/20/2020     Priority: Low    Back pain 07/22/2019     Priority: Low    Left leg pain 07/22/2019     Priority: Low    Persistent atrial fibrillation ([address] 07/18/2018     Priority: Low     UNABLE TO TOLERATE ANTICOAGULATION  1. Asymptomatic. Diagnosed in office on EKG 7/18/17        Lumbar stenosis with neurogenic claudication 09/28/2016     Priority: Low    Carotid artery stenosis without cerebral infarction 09/28/2016     Priority: Low     Prior Right Carotid stent. 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 CABG in 12/98:   5 vessel. Peripheral vascular disease ([address] 07/13/2016     Priority: Low     1. Duplex of LE (10/8/13): Moderate to severe left distal SFA and proximal   PFA stenosis. Diffuse calcification of the right leg vasculature. Resultant acoustic shadowing. Right NERI - 1.09  Left NERI - 0.58        Chronic systolic heart failure ([address] 07/13/2016     Priority: Low     PLEASE DO NOT DELETE  1.  Echo (7/27/12):  EF 40-45%. Paradoxical septal motion. Pseudo-normal   pattern of diastolic dysfunction. Normal RV function. Mild atrial   enlargement. Moderate mitral and tricuspid regurgitation. Moderate   pulmonary hypertension. RVSP 52.  2.  COMFORT (5/13):  EF 50-55%. Moderate LAE.   Moderate mitral and tricuspid   regurgitation. 3. Echo (16):  EF 55%. Grade 1 DD. Mild aortic stenosis. MG 13. Mild to moderate mitral regurgitation. 4.  Echo (17):  EF 55-60%. Abnormal diastolic dysfunction. Mild AS. MG 14.  PG 25. Mild mitral regurgitation. 5.  Echo (10/29/18):  EF 55-60%. Mild AS. Peak AV velocity 1.86. PG 14. Mild to moderate MR. Moderate TR.  RVSP 36.   6.  Echo (20):  EF 55-60%. Severe LAE. Moderate NATALIIA.  AVSC. Severe   eccentric MR. Mild to moderate TR. Syncope 2016     Priority: Low     1. Admitted 2016 with syncope. Felt due to micturation syncope. Noted to have Type I second degree AV block. Coreg discontinued. Social History     Tobacco Use    Smoking status: Former     Packs/day: 4.00     Types: Cigarettes     Quit date: 1980     Years since quittin.0    Smokeless tobacco: Never   Substance Use Topics    Alcohol use: No       ROS:    Review of Systems   Constitutional: Positive for malaise/fatigue. Cardiovascular:  Negative for chest pain and dyspnea on exertion. Respiratory:  Negative for shortness of breath. Musculoskeletal:  Positive for back pain. Negative for arthritis. Neurological:  Negative for focal weakness. Psychiatric/Behavioral:  Negative for depression. PHYSICAL EXAM:  Wt Readings from Last 3 Encounters:   23 132 lb 12.8 oz (60.2 kg)   22 135 lb 12.8 oz (61.6 kg)   21 148 lb 6.4 oz (67.3 kg)     BP Readings from Last 3 Encounters:   23 (!) 110/52   22 138/82   21 (!) 146/82     Pulse Readings from Last 3 Encounters:   23 62   22 72   21 66       Physical Exam  Constitutional:       General: He is not in acute distress. Neck:      Vascular: No carotid bruit. Cardiovascular:      Rate and Rhythm: Normal rate. Rhythm irregular. Heart sounds: Murmur heard. Pulmonary:      Effort: No respiratory distress.       Breath sounds: Normal breath sounds. Abdominal:      General: Bowel sounds are normal.      Palpations: Abdomen is soft. Musculoskeletal:         General: No swelling. Skin:     General: Skin is warm and dry. Neurological:      General: No focal deficit present. Psychiatric:         Mood and Affect: Mood normal.       Medical problems and test results were reviewed with the patient today. Lab Results   Component Value Date    WBC 7.7 12/13/2021    HGB 11.7 (L) 12/13/2021    HCT 35.6 (L) 12/13/2021    MCV 92.2 12/13/2021     12/13/2021       Lab Results   Component Value Date/Time     12/13/2021 10:48 AM    K 4.3 12/13/2021 10:48 AM     12/13/2021 10:48 AM    CO2 20 12/13/2021 10:48 AM    BUN 23 12/13/2021 10:48 AM    CREATININE 1.50 12/13/2021 10:48 AM    GLUCOSE 104 12/13/2021 10:48 AM    CALCIUM 8.8 12/13/2021 10:48 AM        No results found for: CHOL  No results found for: TRIG  No results found for: HDL  No results found for: LDLCHOLESTEROL, LDLCALC  No results found for: LABVLDL, VLDL  No results found for: CHOLHDLRATIO     Data from outside records/labs from outside providers have been reviewed and summarized as noted in the HPI, past history and data review sections of this note       ASSESSMENT and PLAN      1. Persistent atrial fibrillation (HCC)  Controlled without any AV dawn blocking agents. Intolerant to most medication. Unable to take anticoagulation.  - Basic Metabolic Panel; Future  - CBC; Future  - TSH; Future    2. Weight loss  Encouraged to discuss with PCP. Check TSH and labs. 3. Essential hypertension  Blood pressure controlled. Continue lisinopril. 4. Atherosclerosis of native coronary artery of native heart without angina pectoris  No angina. 5. Stage 3 chronic kidney disease, unspecified whether stage 3a or 3b CKD ([facility]  Check labs today. - Basic Metabolic Panel; Future           Return in about 6 months (around 7/5/2023).        Ambar Mayes MD [place]  1/5/2023  1:48 PM    This note may have been dictated using speech recognition software.   As a result, error of speech recognition may have occurred

## 2023-01-09 ENCOUNTER — TELEPHONE (OUTPATIENT)
Dept: CARDIOLOGY CLINIC | Age: 88
End: 2023-01-09

## 2023-01-09 NOTE — TELEPHONE ENCOUNTER
Left message to call me back//brendab    ----- Message from Aric Costa MD sent at 1/9/2023  8:01 AM EST -----  Please call patient. Labs are reviewed and are acceptable. His thyroid test was borderline elevated. Not to a point that I would feel that he would need treatment. He could discuss this with his PCP at their next visit ,,  continue current medications. Call with any questions, concerns or new/worsening cardiac symptoms.

## 2023-02-15 ENCOUNTER — OFFICE VISIT (OUTPATIENT)
Dept: INTERNAL MEDICINE CLINIC | Facility: CLINIC | Age: 88
End: 2023-02-15

## 2023-02-15 VITALS
TEMPERATURE: 97.3 F | BODY MASS INDEX: 20.92 KG/M2 | OXYGEN SATURATION: 97 % | WEIGHT: 138 LBS | SYSTOLIC BLOOD PRESSURE: 171 MMHG | HEIGHT: 68 IN | HEART RATE: 71 BPM | DIASTOLIC BLOOD PRESSURE: 81 MMHG

## 2023-02-15 DIAGNOSIS — R63.4 WEIGHT LOSS: ICD-10-CM

## 2023-02-15 DIAGNOSIS — R97.20 ELEVATED PSA: ICD-10-CM

## 2023-02-15 DIAGNOSIS — E78.2 MIXED HYPERLIPIDEMIA: ICD-10-CM

## 2023-02-15 DIAGNOSIS — N18.31 STAGE 3A CHRONIC KIDNEY DISEASE (HCC): ICD-10-CM

## 2023-02-15 DIAGNOSIS — I73.9 PERIPHERAL VASCULAR DISEASE (HCC): ICD-10-CM

## 2023-02-15 DIAGNOSIS — I50.22 CHRONIC SYSTOLIC HEART FAILURE (HCC): ICD-10-CM

## 2023-02-15 DIAGNOSIS — Z00.00 MEDICARE ANNUAL WELLNESS VISIT, SUBSEQUENT: Primary | ICD-10-CM

## 2023-02-15 DIAGNOSIS — R06.09 DOE (DYSPNEA ON EXERTION): ICD-10-CM

## 2023-02-15 DIAGNOSIS — I48.19 PERSISTENT ATRIAL FIBRILLATION (HCC): ICD-10-CM

## 2023-02-15 DIAGNOSIS — I10 ESSENTIAL HYPERTENSION: ICD-10-CM

## 2023-02-15 DIAGNOSIS — I25.810 CORONARY ARTERY DISEASE INVOLVING CORONARY BYPASS GRAFT OF NATIVE HEART WITHOUT ANGINA PECTORIS: ICD-10-CM

## 2023-02-15 DIAGNOSIS — K59.09 CHRONIC CONSTIPATION: ICD-10-CM

## 2023-02-15 PROBLEM — M54.9 BACK PAIN: Status: RESOLVED | Noted: 2019-07-22 | Resolved: 2023-02-15

## 2023-02-15 PROBLEM — M79.605 LEFT LEG PAIN: Status: RESOLVED | Noted: 2019-07-22 | Resolved: 2023-02-15

## 2023-02-15 LAB
BASOPHILS # BLD: 0 K/UL (ref 0–0.2)
BASOPHILS NFR BLD: 1 % (ref 0–2)
DIFFERENTIAL METHOD BLD: ABNORMAL
EOSINOPHIL # BLD: 0.3 K/UL (ref 0–0.8)
EOSINOPHIL NFR BLD: 5 % (ref 0.5–7.8)
ERYTHROCYTE [DISTWIDTH] IN BLOOD BY AUTOMATED COUNT: 14.3 % (ref 11.9–14.6)
HCT VFR BLD AUTO: 36.5 % (ref 41.1–50.3)
HGB BLD-MCNC: 12 G/DL (ref 13.6–17.2)
IMM GRANULOCYTES # BLD AUTO: 0 K/UL (ref 0–0.5)
IMM GRANULOCYTES NFR BLD AUTO: 0 % (ref 0–5)
LYMPHOCYTES # BLD: 1 K/UL (ref 0.5–4.6)
LYMPHOCYTES NFR BLD: 16 % (ref 13–44)
MCH RBC QN AUTO: 31.5 PG (ref 26.1–32.9)
MCHC RBC AUTO-ENTMCNC: 32.9 G/DL (ref 31.4–35)
MCV RBC AUTO: 95.8 FL (ref 82–102)
MONOCYTES # BLD: 0.8 K/UL (ref 0.1–1.3)
MONOCYTES NFR BLD: 12 % (ref 4–12)
NEUTS SEG # BLD: 4.5 K/UL (ref 1.7–8.2)
NEUTS SEG NFR BLD: 66 % (ref 43–78)
NRBC # BLD: 0 K/UL (ref 0–0.2)
PLATELET # BLD AUTO: 227 K/UL (ref 150–450)
PMV BLD AUTO: 10.3 FL (ref 9.4–12.3)
RBC # BLD AUTO: 3.81 M/UL (ref 4.23–5.6)
WBC # BLD AUTO: 6.7 K/UL (ref 4.3–11.1)

## 2023-02-15 RX ORDER — LISINOPRIL 20 MG/1
20 TABLET ORAL DAILY
Qty: 90 TABLET | Refills: 0 | Status: SHIPPED | OUTPATIENT
Start: 2023-02-15

## 2023-02-15 SDOH — ECONOMIC STABILITY: HOUSING INSECURITY
IN THE LAST 12 MONTHS, WAS THERE A TIME WHEN YOU DID NOT HAVE A STEADY PLACE TO SLEEP OR SLEPT IN A SHELTER (INCLUDING NOW)?: NO

## 2023-02-15 SDOH — ECONOMIC STABILITY: INCOME INSECURITY: HOW HARD IS IT FOR YOU TO PAY FOR THE VERY BASICS LIKE FOOD, HOUSING, MEDICAL CARE, AND HEATING?: NOT HARD AT ALL

## 2023-02-15 SDOH — ECONOMIC STABILITY: FOOD INSECURITY: WITHIN THE PAST 12 MONTHS, YOU WORRIED THAT YOUR FOOD WOULD RUN OUT BEFORE YOU GOT MONEY TO BUY MORE.: NEVER TRUE

## 2023-02-15 SDOH — ECONOMIC STABILITY: FOOD INSECURITY: WITHIN THE PAST 12 MONTHS, THE FOOD YOU BOUGHT JUST DIDN'T LAST AND YOU DIDN'T HAVE MONEY TO GET MORE.: NEVER TRUE

## 2023-02-15 ASSESSMENT — ANXIETY QUESTIONNAIRES
6. BECOMING EASILY ANNOYED OR IRRITABLE: 0
1. FEELING NERVOUS, ANXIOUS, OR ON EDGE: 0
IF YOU CHECKED OFF ANY PROBLEMS ON THIS QUESTIONNAIRE, HOW DIFFICULT HAVE THESE PROBLEMS MADE IT FOR YOU TO DO YOUR WORK, TAKE CARE OF THINGS AT HOME, OR GET ALONG WITH OTHER PEOPLE: NOT DIFFICULT AT ALL
5. BEING SO RESTLESS THAT IT IS HARD TO SIT STILL: 0
3. WORRYING TOO MUCH ABOUT DIFFERENT THINGS: 0
4. TROUBLE RELAXING: 0
7. FEELING AFRAID AS IF SOMETHING AWFUL MIGHT HAPPEN: 0
2. NOT BEING ABLE TO STOP OR CONTROL WORRYING: 0
GAD7 TOTAL SCORE: 0

## 2023-02-15 ASSESSMENT — PATIENT HEALTH QUESTIONNAIRE - PHQ9
SUM OF ALL RESPONSES TO PHQ QUESTIONS 1-9: 0
SUM OF ALL RESPONSES TO PHQ9 QUESTIONS 1 & 2: 0
SUM OF ALL RESPONSES TO PHQ QUESTIONS 1-9: 0
2. FEELING DOWN, DEPRESSED OR HOPELESS: 0
SUM OF ALL RESPONSES TO PHQ QUESTIONS 1-9: 0
1. LITTLE INTEREST OR PLEASURE IN DOING THINGS: 0
SUM OF ALL RESPONSES TO PHQ QUESTIONS 1-9: 0

## 2023-02-15 ASSESSMENT — ENCOUNTER SYMPTOMS
SHORTNESS OF BREATH: 0
BLOOD IN STOOL: 0

## 2023-02-15 NOTE — PATIENT INSTRUCTIONS
I recommend all standard healthcare maintenance and cancer screenings (Colon cancer screening, Mammogram and Bone Density if female and appropriate, etc) and standard immunizations (Flu, Pneumonia, Covid-19, Tetanus boosters, etc) as appropriate and due to lower your risk for potentially preventable morbidity/mortality from these diseases. Advance Directives: Care Instructions  Overview  An advance directive is a legal way to state your wishes at the end of your life. It tells your family and your doctor what to do if you can't say what you want. There are two main types of advance directives. You can change them any time your wishes change. Living will. This form tells your family and your doctor your wishes about life support and other treatment. The form is also called a declaration. Medical power of . This form lets you name a person to make treatment decisions for you when you can't speak for yourself. This person is called a health care agent (health care proxy, health care surrogate). The form is also called a durable power of  for health care. If you do not have an advance directive, decisions about your medical care may be made by a family member, or by a doctor or a  who doesn't know you. It may help to think of an advance directive as a gift to the people who care for you. If you have one, they won't have to make tough decisions by themselves. For more information, including forms for your state, see the [address] website (www.caringinfo.org/planning/advance-directives/). Follow-up care is a key part of your treatment and safety. Be sure to make and go to all appointments, and call your doctor if you are having problems. It's also a good idea to know your test results and keep a list of the medicines you take. What should you include in an advance directive? Many states have a unique advance directive form.  (It may ask you to address specific issues.) Or you might use a universal form that's approved by many states. If your form doesn't tell you what to address, it may be hard to know what to include in your advance directive. Use the questions below to help you get started. Who do you want to make decisions about your medical care if you are not able to? What life-support measures do you want if you have a serious illness that gets worse over time or can't be cured? What are you most afraid of that might happen? (Maybe you're afraid of having pain, losing your independence, or being kept alive by machines.)  Where would you prefer to die? (Your home? A hospital? A nursing home?)  Do you want to donate your organs when you die? Do you want certain Gnosticist practices performed before you die? When should you call for help? Be sure to contact your doctor if you have any questions. Where can you learn more? Go to http://www.mondragon.com/ and enter R264 to learn more about \"Advance Directives: Care Instructions. \"  Current as of: June 16, 2022               Content Version: 13.5  © 4868-5349 [employer]. Care instructions adapted under license by [facility] ([facility]). If you have questions about a medical condition or this instruction, always ask your healthcare professional. [address] any warranty or liability for your use of this information. A Healthy Heart: Care Instructions  Your Care Instructions     Coronary artery disease, also called heart disease, occurs when a substance called plaque builds up in the vessels that supply oxygen-rich blood to your heart muscle. This can narrow the blood vessels and reduce blood flow. A heart attack happens when blood flow is completely blocked. A high-fat diet, smoking, and other factors increase the risk of heart disease. Your doctor has found that you have a chance of having heart disease. You can do lots of things to keep your heart healthy.  It may not be easy, but you can change your diet, exercise more, and quit smoking. These steps really work to lower your chance of heart disease. Follow-up care is a key part of your treatment and safety. Be sure to make and go to all appointments, and call your doctor if you are having problems. It's also a good idea to know your test results and keep a list of the medicines you take. How can you care for yourself at home? Diet    Use less salt when you cook and eat. This helps lower your blood pressure. Taste food before salting. Add only a little salt when you think you need it. With time, your taste buds will adjust to less salt. Eat fewer snack items, fast foods, canned soups, and other high-salt, high-fat, processed foods. Read food labels and try to avoid saturated and trans fats. They increase your risk of heart disease by raising cholesterol levels. Limit the amount of solid fat-butter, margarine, and shortening-you eat. Use olive, peanut, or canola oil when you cook. Bake, broil, and steam foods instead of frying them. Eat a variety of fruit and vegetables every day. Dark green, deep orange, red, or yellow fruits and vegetables are especially good for you. Examples include spinach, carrots, peaches, and berries. Foods high in fiber can reduce your cholesterol and provide important vitamins and minerals. High-fiber foods include whole-grain cereals and breads, oatmeal, beans, brown rice, citrus fruits, and apples. Eat lean proteins. Heart-healthy proteins include seafood, lean meats and poultry, eggs, beans, peas, nuts, seeds, and soy products. Limit drinks and foods with added sugar. These include candy, desserts, and soda pop. Lifestyle changes    If your doctor recommends it, get more exercise. Walking is a good choice. Bit by bit, increase the amount you walk every day. Try for at least 30 minutes on most days of the week. You also may want to swim, bike, or do other activities. Do not smoke. If you need help quitting, talk to your doctor about stop-smoking programs and medicines. These can increase your chances of quitting for good. Quitting smoking may be the most important step you can take to protect your heart. It is never too late to quit. Limit alcohol to 2 drinks a day for men and 1 drink a day for women. Too much alcohol can cause health problems. Manage other health problems such as diabetes, high blood pressure, and high cholesterol. If you think you may have a problem with alcohol or drug use, talk to your doctor. Medicines    Take your medicines exactly as prescribed. Call your doctor if you think you are having a problem with your medicine. If your doctor recommends aspirin, take the amount directed each day. Make sure you take aspirin and not another kind of pain reliever, such as acetaminophen (Tylenol). When should you call for help? Call 911 if you have symptoms of a heart attack. These may include:    Chest pain or pressure, or a strange feeling in the chest.     Sweating. Shortness of breath. Pain, pressure, or a strange feeling in the back, neck, jaw, or upper belly or in one or both shoulders or arms. Lightheadedness or sudden weakness. A fast or irregular heartbeat. After you call 911, the  may tell you to chew 1 adult-strength or 2 to 4 low-dose aspirin. Wait for an ambulance. Do not try to drive yourself. Watch closely for changes in your health, and be sure to contact your doctor if you have any problems. Where can you learn more? Go to http://www.mondragon.com/ and enter F075 to learn more about \"A Healthy Heart: Care Instructions. \"  Current as of: September 7, 2022               Content Version: 13.5  © 5306-6725 [employer]. Care instructions adapted under license by [facility] ([facility]).  If you have questions about a medical condition or this instruction, always ask your healthcare professional. [employer] disclaims any warranty or liability for your use of this information. Personalized Preventive Plan for Abner White - 2/15/2023  Medicare offers a range of preventive health benefits. Some of the tests and screenings are paid in full while other may be subject to a deductible, co-insurance, and/or copay. Some of these benefits include a comprehensive review of your medical history including lifestyle, illnesses that may run in your family, and various assessments and screenings as appropriate. After reviewing your medical record and screening and assessments performed today your provider may have ordered immunizations, labs, imaging, and/or referrals for you. A list of these orders (if applicable) as well as your Preventive Care list are included within your After Visit Summary for your review. Other Preventive Recommendations:    A preventive eye exam performed by an eye specialist is recommended every 1-2 years to screen for glaucoma; cataracts, macular degeneration, and other eye disorders. A preventive dental visit is recommended every 6 months. Try to get at least 150 minutes of exercise per week or 10,000 steps per day on a pedometer . Order or download the FREE \"Exercise & Physical Activity: Your Everyday Guide\" from [employer]. Call 1-375.921.1540 or search [employer] online. You need 1514-4876 mg of calcium and 5529-8959 IU of vitamin D per day. It is possible to meet your calcium requirement with diet alone, but a vitamin D supplement is usually necessary to meet this goal.  When exposed to the sun, use a sunscreen that protects against both UVA and UVB radiation with an SPF of 30 or greater. Reapply every 2 to 3 hours or after sweating, drying off with a towel, or swimming. Always wear a seat belt when traveling in a car. Always wear a helmet when riding a bicycle or motorcycle.

## 2023-02-15 NOTE — PROGRESS NOTES
Iris Huitron M.D. Internal Medicine  [address], [address]   Phone: [phone]  Fax: [phone]    Hyperlipidemia  This is a chronic problem. The current episode started more than 1 year ago. The problem is uncontrolled. Recent lipid tests were reviewed and are high. Exacerbating diseases include chronic renal disease. There are no known factors aggravating his hyperlipidemia. Pertinent negatives include no chest pain or shortness of breath. He is currently on no antihyperlipidemic treatment. The current treatment provides no improvement of lipids. Compliance problems: Intolerant of/refuses Statin therapy. Declines Repatha. Risk factors for coronary artery disease include male sex and dyslipidemia. Kinsey Lott is a 80 y.o. White (non-) male. I last saw him 2/20/20 and recommended 2 month f/u. Current Outpatient Medications   Medication Sig Dispense Refill    aspirin 81 MG EC tablet Take 81 mg by mouth daily (Patient not taking: Reported on 1/5/2023)      ketorolac (TORADOL) 10 MG tablet Take 10 mg by mouth every 6 hours as needed      latanoprost (XALATAN) 0.005 % ophthalmic solution Apply 1 drop to eye      lisinopril (PRINIVIL;ZESTRIL) 20 MG tablet Take 20 mg by mouth daily (Patient not taking: Reported on 1/5/2023)      nitroGLYCERIN (NITROSTAT) 0.4 MG SL tablet Place 0.4 mg under the tongue       No current facility-administered medications for this visit. Allergies   Allergen Reactions    Bimatoprost Anaphylaxis     And skinned peeled    Benzalkonium Other (See Comments)    Iodine Hives     Past Medical History:   Diagnosis Date    Anemia 5/21/2013    Bruit 7/13/2016    CAD (coronary artery disease) 1999    CABG---- no mi/stents--- followed by dr. Antoni Collins    Carotid artery stenosis without cerebral infarction 7/13/2016    1. Right carotid stent August 2015.       CHF (congestive heart failure) (HCC)     Chronic pain x yrs    lower back--     Chronic systolic heart failure ([facility] 2016    Coronary atherosclerosis of native coronary vessel 2016    Depression 2013    Dermatitis 2013    Edema 2016    Extremity pain 2016    GERD (gastroesophageal reflux disease)     occ-- no meds    Glaucoma     HLD (hyperlipidemia)     HTN (hypertension)     controlled with med    Lumbar stenosis with neurogenic claudication 4/15/2015    Mild aortic stenosis by prior echocardiogram     echo dated 14    Mitral valve regurgitation 2016    Peripheral vascular disease ([facility] 2016    Pulmonary HTN ([facility] 2016    Pulmonary nodule 2012    Seen on CXR . CT showed a calcified granuloma but no nodule. Rash 2016    Spinal stenosis of thoracic region 2016    Syncope 2016     Past Surgical History:   Procedure Laterality Date    APPENDECTOMY      BACK SURGERY  08/10/2018    CHOLECYSTECTOMY      open    GI      hemmrhoidectomy    HERNIA REPAIR      [place] UNLISTED PROCEDURE CARDIAC SURGERY  1998    5 vessel bypass      Social History     Tobacco Use    Smoking status: Former     Packs/day: 4.00     Types: Cigarettes     Quit date: 1980     Years since quittin.1    Smokeless tobacco: Never   Substance Use Topics    Alcohol use: No    Drug use: No     Family History   Problem Relation Age of Onset    No Known Problems Mother     Stroke Father     Heart Disease Brother       Review of Systems   Respiratory:  Negative for shortness of breath. Cardiovascular:  Negative for chest pain. Gastrointestinal:  Negative for blood in stool. Physical Exam  Vitals and nursing note reviewed. Constitutional:       General: He is not in acute distress. Appearance: Normal appearance. He is not ill-appearing, toxic-appearing or diaphoretic. HENT:      Head: Normocephalic and atraumatic. Right Ear: External ear normal.      Left Ear: External ear normal.   Eyes:      General: No scleral icterus. Right eye: No discharge. Left eye: No discharge. Conjunctiva/sclera: Conjunctivae normal.   Cardiovascular:      Rate and Rhythm: Normal rate and regular rhythm. Heart sounds: Normal heart sounds. No murmur heard. No friction rub. No gallop. Pulmonary:      Effort: Pulmonary effort is normal. No respiratory distress. Breath sounds: Normal breath sounds. No stridor. No wheezing, rhonchi or rales. Abdominal:      General: Abdomen is flat. Bowel sounds are normal. There is no distension. Palpations: Abdomen is soft. There is no mass. Tenderness: There is no abdominal tenderness. There is no guarding or rebound. Musculoskeletal:      Right lower leg: No edema. Left lower leg: No edema. Skin:     General: Skin is warm and dry. Coloration: Skin is not jaundiced or pale. Findings: No erythema. Neurological:      General: No focal deficit present. Mental Status: He is alert and oriented to person, place, and time. Mental status is at baseline. Psychiatric:         Mood and Affect: Mood normal.         Behavior: Behavior normal.         Thought Content: Thought content normal.         Judgment: Judgment normal.      ASSESSMENT AND PLAN:     CAD: CABG x 5 by Dr. Radha Canela 12/1998. Follows with Cardiology (Dr. Glen Zelaya). Maintained on Asa. Risk factors medically modified per below. Taking medications w/o problems. Currently asymptomatic w/o angina or GUTIERREZ. Well controlled w/o angina or GUTIERREZ. Continue Asa. Continue risk factor modification per below. Follow up with Dr. Glen Zelaya. (Also with PVD, CHF, and AFib)   HTN: Non-compliant with current regimen (Lisinopril 20). Home BPs = Not being checked. Not well controlled due to patient non-compliance. Recommend compliance with current regimen to lower risk for morbidity/mortality. Asked to monitor BP at home, call me if it runs above 140/80, and bring in a log next time.    HLD: Intolerant to Statins (myalgias) and refuses Repatha. Thus, uncontrolled. FLPs:   12/14/12 Untreated = [224/166/38/99]. 2/15/23 Untreated = [  CKD3: Baseline Cr ? 1.1-1.5 and stable. Stable and asymptomatic. Continue to monitor. Counseled to avoid NSAIDs. HCM:   Immunizations: Refuses all standard immunizations which I recommend to lower risk for morbidity/mortality from those diseases. Prostate:   PSA's:  1/22/14 = 0.5.   6/18/19 = 4.3.   2/15/23 =   F/u: 3 Months. No planned labs at that visit. He reports constipation and weight loss. Needs labs. Needs GI eval.  CXR for the weight loss. Needs PFTs as he's had GUTIERREZ. Jerardo Prakash was seen today for hypertension, cholesterol problem and medicare awv. Diagnoses and all orders for this visit:    Medicare annual wellness visit, subsequent    Mixed hyperlipidemia  -     Basic Metabolic Panel; Future  -     CBC with Auto Differential; Future  -     Lipid Panel; Future  -     Hepatic Function Panel; Future  -     TSH; Future  -     Urinalysis; Future    Essential hypertension  -     Basic Metabolic Panel; Future  -     CBC with Auto Differential; Future  -     Lipid Panel; Future  -     Hepatic Function Panel; Future  -     TSH; Future  -     Urinalysis; Future    Weight loss  -     [facility]FL - [facility]  -     XR CHEST PA LAT (2 VIEWS); Future    Chronic constipation  -     [facility] - [facility]    Coronary artery disease involving coronary bypass graft of native heart without angina pectoris  -     Basic Metabolic Panel; Future  -     CBC with Auto Differential; Future  -     Lipid Panel; Future  -     Hepatic Function Panel; Future  -     TSH; Future  -     Urinalysis; Future    Stage 3a chronic kidney disease ([facility]  -     Basic Metabolic Panel; Future  -     CBC with Auto Differential; Future  -     Lipid Panel; Future  -     Hepatic Function Panel; Future  -     TSH; Future  -     Urinalysis;  Future    Peripheral vascular disease ([facility]  -     Basic Metabolic Panel; Future  -     CBC with Auto Differential; Future  -     Lipid Panel; Future  -     Hepatic Function Panel; Future  -     TSH; Future  -     Urinalysis; Future    Persistent atrial fibrillation (HCC)  -     Basic Metabolic Panel; Future  -     CBC with Auto Differential; Future  -     Lipid Panel; Future  -     Hepatic Function Panel; Future  -     TSH; Future  -     Urinalysis; Future    Chronic systolic heart failure ([facility]  -     Basic Metabolic Panel; Future  -     CBC with Auto Differential; Future  -     Lipid Panel; Future  -     Hepatic Function Panel; Future  -     TSH; Future  -     Urinalysis; Future    Elevated PSA  -     PSA, ultrasensitive; Future    Other orders  -     lisinopril (PRINIVIL;ZESTRIL) 20 MG tablet; Take 1 tablet by mouth daily    Return in about 3 months (around 5/15/2023). Medicare Annual Wellness Visit    Versa Mumtaz is here for Hypertension, Cholesterol Problem, and Medicare AWV    Assessment & Plan   Medicare annual wellness visit, subsequent  Mixed hyperlipidemia  -     Basic Metabolic Panel; Future  -     CBC with Auto Differential; Future  -     Lipid Panel; Future  -     Hepatic Function Panel; Future  -     TSH; Future  -     Urinalysis; Future  Essential hypertension  -     Basic Metabolic Panel; Future  -     CBC with Auto Differential; Future  -     Lipid Panel; Future  -     Hepatic Function Panel; Future  -     TSH; Future  -     Urinalysis; Future  Weight loss  -     [facility] - [facility]  -     XR CHEST PA LAT (2 VIEWS); Future  Chronic constipation  -     [facility] - [facility]  Coronary artery disease involving coronary bypass graft of native heart without angina pectoris  -     Basic Metabolic Panel; Future  -     CBC with Auto Differential; Future  -     Lipid Panel; Future  -     Hepatic Function Panel; Future  -     TSH; Future  -     Urinalysis;  Future  Stage 3a chronic kidney disease ([facility]  -     Basic Metabolic Panel; Future  -     CBC with Auto Differential; Future  -     Lipid Panel; Future  -     Hepatic Function Panel; Future  -     TSH; Future  -     Urinalysis; Future  Peripheral vascular disease ([facility]  -     Basic Metabolic Panel; Future  -     CBC with Auto Differential; Future  -     Lipid Panel; Future  -     Hepatic Function Panel; Future  -     TSH; Future  -     Urinalysis; Future  Persistent atrial fibrillation (HCC)  -     Basic Metabolic Panel; Future  -     CBC with Auto Differential; Future  -     Lipid Panel; Future  -     Hepatic Function Panel; Future  -     TSH; Future  -     Urinalysis; Future  Chronic systolic heart failure ([facility]  -     Basic Metabolic Panel; Future  -     CBC with Auto Differential; Future  -     Lipid Panel; Future  -     Hepatic Function Panel; Future  -     TSH; Future  -     Urinalysis; Future  Elevated PSA  -     PSA, ultrasensitive; Future      Recommendations for Preventive Services Due: see orders and patient instructions/AVS.  Recommended screening schedule for the next 5-10 years is provided to the patient in written form: see Patient Instructions/AVS.     No follow-ups on file. Subjective   The following acute and/or chronic problems were also addressed today:  Uncontrolled HLD and HTN. Patient's complete Health Risk Assessment and screening values have been reviewed and are found in Flowsheets. The following problems were reviewed today and where indicated follow up appointments were made and/or referrals ordered. Positive Risk Factor Screenings with Interventions:               General HRA Questions:              Safety:  Do all of your stairways have a railing or banister?: (!) No  Interventions:  Recommend obtaining the above items. Objective   Vitals:    02/15/23 1304   BP: (!) 171/81   Pulse: 71   Temp: 97.3 °F (36.3 °C)   TempSrc: Temporal   SpO2: 97%   Weight: 138 lb (62.6 kg)   Height: 5' 8\" (1.727 m)      Body mass index is 20.98 kg/m². Allergies   Allergen Reactions    Bimatoprost Anaphylaxis     And skinned peeled    Benzalkonium Other (See Comments)    Iodine Hives     Prior to Visit Medications    Medication Sig Taking?  Authorizing Provider   aspirin 81 MG EC tablet Take 81 mg by mouth daily  Ar Automatic Reconciliation   ketorolac (TORADOL) 10 MG tablet Take 10 mg by mouth every 6 hours as needed  Ar Automatic Reconciliation   latanoprost (XALATAN) 0.005 % ophthalmic solution Apply 1 drop to eye  Ar Automatic Reconciliation   lisinopril (PRINIVIL;ZESTRIL) 20 MG tablet Take 20 mg by mouth daily  Patient not taking: No sig reported  Ar Automatic Reconciliation   nitroGLYCERIN (NITROSTAT) 0.4 MG SL tablet Place 0.4 mg under the tongue  Ar Automatic Reconciliation       CareTeam (Including outside providers/suppliers regularly involved in providing care):   Patient Care Team:  James Padilla MD as PCP - General  James Padilla MD as PCP - Empaneled Provider     Reviewed and updated this visit:  Tobacco  Allergies  Meds  Problems  Med Hx  Surg Hx  Soc Hx  Fam Hx               James Padilla MD

## 2023-02-16 LAB
ALBUMIN SERPL-MCNC: 3.9 G/DL (ref 3.2–4.6)
ALBUMIN/GLOB SERPL: 1.3 (ref 0.4–1.6)
ALP SERPL-CCNC: 78 U/L (ref 50–136)
ALT SERPL-CCNC: 17 U/L (ref 12–65)
ANION GAP SERPL CALC-SCNC: 7 MMOL/L (ref 2–11)
AST SERPL-CCNC: 19 U/L (ref 15–37)
BILIRUB DIRECT SERPL-MCNC: 0.2 MG/DL
BILIRUB SERPL-MCNC: 0.8 MG/DL (ref 0.2–1.1)
BUN SERPL-MCNC: 25 MG/DL (ref 8–23)
CALCIUM SERPL-MCNC: 9.2 MG/DL (ref 8.3–10.4)
CHLORIDE SERPL-SCNC: 107 MMOL/L (ref 101–110)
CHOLEST SERPL-MCNC: 169 MG/DL
CO2 SERPL-SCNC: 23 MMOL/L (ref 21–32)
CREAT SERPL-MCNC: 1.5 MG/DL (ref 0.8–1.5)
GLOBULIN SER CALC-MCNC: 2.9 G/DL (ref 2.8–4.5)
GLUCOSE SERPL-MCNC: 83 MG/DL (ref 65–100)
HDLC SERPL-MCNC: 49 MG/DL (ref 40–60)
HDLC SERPL: 3.4
LDLC SERPL CALC-MCNC: 107.2 MG/DL
POTASSIUM SERPL-SCNC: 4.3 MMOL/L (ref 3.5–5.1)
PROT SERPL-MCNC: 6.8 G/DL (ref 6.3–8.2)
SODIUM SERPL-SCNC: 137 MMOL/L (ref 133–143)
TRIGL SERPL-MCNC: 64 MG/DL (ref 35–150)
TSH, 3RD GENERATION: 4.06 UIU/ML (ref 0.36–3.74)
VLDLC SERPL CALC-MCNC: 12.8 MG/DL (ref 6–23)

## 2023-02-17 LAB — PSA SERPL DL<=0.01 NG/ML-MCNC: 5.61 NG/ML (ref 0–4)

## 2023-05-15 ENCOUNTER — OFFICE VISIT (OUTPATIENT)
Dept: INTERNAL MEDICINE CLINIC | Facility: CLINIC | Age: 88
End: 2023-05-15
Payer: MEDICARE

## 2023-05-15 VITALS
WEIGHT: 137 LBS | OXYGEN SATURATION: 98 % | DIASTOLIC BLOOD PRESSURE: 60 MMHG | BODY MASS INDEX: 20.76 KG/M2 | HEART RATE: 63 BPM | SYSTOLIC BLOOD PRESSURE: 136 MMHG | TEMPERATURE: 98 F | HEIGHT: 68 IN

## 2023-05-15 DIAGNOSIS — R06.02 SOB (SHORTNESS OF BREATH): ICD-10-CM

## 2023-05-15 DIAGNOSIS — I25.810 CORONARY ARTERY DISEASE INVOLVING CORONARY BYPASS GRAFT OF NATIVE HEART WITHOUT ANGINA PECTORIS: Primary | ICD-10-CM

## 2023-05-15 DIAGNOSIS — N18.31 STAGE 3A CHRONIC KIDNEY DISEASE (HCC): ICD-10-CM

## 2023-05-15 DIAGNOSIS — I10 ESSENTIAL HYPERTENSION: ICD-10-CM

## 2023-05-15 DIAGNOSIS — E78.2 MIXED HYPERLIPIDEMIA: ICD-10-CM

## 2023-05-15 DIAGNOSIS — I50.22 CHRONIC SYSTOLIC HEART FAILURE (HCC): ICD-10-CM

## 2023-05-15 DIAGNOSIS — I48.19 PERSISTENT ATRIAL FIBRILLATION (HCC): ICD-10-CM

## 2023-05-15 DIAGNOSIS — I73.9 PERIPHERAL VASCULAR DISEASE (HCC): ICD-10-CM

## 2023-05-15 PROCEDURE — 1036F TOBACCO NON-USER: CPT | Performed by: INTERNAL MEDICINE

## 2023-05-15 PROCEDURE — 1123F ACP DISCUSS/DSCN MKR DOCD: CPT | Performed by: INTERNAL MEDICINE

## 2023-05-15 PROCEDURE — G8420 CALC BMI NORM PARAMETERS: HCPCS | Performed by: INTERNAL MEDICINE

## 2023-05-15 PROCEDURE — 99214 OFFICE O/P EST MOD 30 MIN: CPT | Performed by: INTERNAL MEDICINE

## 2023-05-15 PROCEDURE — G8427 DOCREV CUR MEDS BY ELIG CLIN: HCPCS | Performed by: INTERNAL MEDICINE

## 2023-05-15 RX ORDER — LISINOPRIL 20 MG/1
20 TABLET ORAL DAILY
Qty: 90 TABLET | Refills: 0 | Status: SHIPPED | OUTPATIENT
Start: 2023-05-15

## 2023-05-15 SDOH — ECONOMIC STABILITY: FOOD INSECURITY: WITHIN THE PAST 12 MONTHS, THE FOOD YOU BOUGHT JUST DIDN'T LAST AND YOU DIDN'T HAVE MONEY TO GET MORE.: NEVER TRUE

## 2023-05-15 SDOH — ECONOMIC STABILITY: INCOME INSECURITY: HOW HARD IS IT FOR YOU TO PAY FOR THE VERY BASICS LIKE FOOD, HOUSING, MEDICAL CARE, AND HEATING?: NOT HARD AT ALL

## 2023-05-15 SDOH — ECONOMIC STABILITY: FOOD INSECURITY: WITHIN THE PAST 12 MONTHS, YOU WORRIED THAT YOUR FOOD WOULD RUN OUT BEFORE YOU GOT MONEY TO BUY MORE.: NEVER TRUE

## 2023-05-15 ASSESSMENT — ANXIETY QUESTIONNAIRES
5. BEING SO RESTLESS THAT IT IS HARD TO SIT STILL: 0
IF YOU CHECKED OFF ANY PROBLEMS ON THIS QUESTIONNAIRE, HOW DIFFICULT HAVE THESE PROBLEMS MADE IT FOR YOU TO DO YOUR WORK, TAKE CARE OF THINGS AT HOME, OR GET ALONG WITH OTHER PEOPLE: NOT DIFFICULT AT ALL
GAD7 TOTAL SCORE: 0
4. TROUBLE RELAXING: 0
1. FEELING NERVOUS, ANXIOUS, OR ON EDGE: 0
6. BECOMING EASILY ANNOYED OR IRRITABLE: 0
2. NOT BEING ABLE TO STOP OR CONTROL WORRYING: 0
3. WORRYING TOO MUCH ABOUT DIFFERENT THINGS: 0
7. FEELING AFRAID AS IF SOMETHING AWFUL MIGHT HAPPEN: 0

## 2023-05-15 ASSESSMENT — PATIENT HEALTH QUESTIONNAIRE - PHQ9
2. FEELING DOWN, DEPRESSED OR HOPELESS: 0
SUM OF ALL RESPONSES TO PHQ9 QUESTIONS 1 & 2: 0
SUM OF ALL RESPONSES TO PHQ QUESTIONS 1-9: 0
1. LITTLE INTEREST OR PLEASURE IN DOING THINGS: 0
SUM OF ALL RESPONSES TO PHQ QUESTIONS 1-9: 0

## 2023-05-15 ASSESSMENT — ENCOUNTER SYMPTOMS
BLOOD IN STOOL: 0
SHORTNESS OF BREATH: 0

## 2023-05-15 NOTE — PROGRESS NOTES
Parker Ellison M.D. Internal Medicine  [address], [address]  Phone: [phone]  Fax: [phone]    Hyperlipidemia  This is a chronic problem. The current episode started more than 1 year ago. The problem is uncontrolled. Recent lipid tests were reviewed and are high. Exacerbating diseases include chronic renal disease. There are no known factors aggravating his hyperlipidemia. Pertinent negatives include no chest pain or shortness of breath. He is currently on no antihyperlipidemic treatment. The current treatment provides no improvement of lipids. Compliance problems: Intolerant of/refuses Statin therapy. Declines Repatha. Risk factors for coronary artery disease include male sex and dyslipidemia. Gudelia Roman is a 80 y.o. White (non-) male. Current Outpatient Medications   Medication Sig Dispense Refill    lisinopril (PRINIVIL;ZESTRIL) 20 MG tablet Take 1 tablet by mouth daily 90 tablet 0    aspirin 81 MG EC tablet Take 81 mg by mouth daily      latanoprost (XALATAN) 0.005 % ophthalmic solution Apply 1 drop to eye      nitroGLYCERIN (NITROSTAT) 0.4 MG SL tablet Place 0.4 mg under the tongue       No current facility-administered medications for this visit. Allergies   Allergen Reactions    Bimatoprost Anaphylaxis     And skinned peeled    Benzalkonium Other (See Comments)    Iodine Hives     Past Medical History:   Diagnosis Date    Anemia 5/21/2013    Bruit 7/13/2016    CAD (coronary artery disease) 1999    CABG---- no mi/stents--- followed by dr. Shari Cline    Carotid artery stenosis without cerebral infarction 7/13/2016    1. Right carotid stent August 2015.       Chronic systolic heart failure ([address] 7/13/2016    Depression 5/21/2013    GERD (gastroesophageal reflux disease)     occ-- no meds    Glaucoma     HLD (hyperlipidemia)     HTN (hypertension)     controlled with med    Lumbar stenosis with neurogenic claudication 4/15/2015

## 2023-05-15 NOTE — PATIENT INSTRUCTIONS
Please arrive 20 minutes prior to your scheduled time to see the doctor to allow sufficient time for check-in and rooming. I recommend all standard healthcare maintenance and cancer screenings (Colon cancer screening if due, Lung cancer screening if due, Mammogram and Bone Density if female and appropriate, etc) and standard immunizations (Flu, Pneumonia, Covid-19, Tetanus boosters, etc) as appropriate and due to lower your risk for potentially preventable morbidity/mortality from these diseases.

## 2023-07-05 ENCOUNTER — OFFICE VISIT (OUTPATIENT)
Age: 88
End: 2023-07-05
Payer: MEDICARE

## 2023-07-05 VITALS
HEIGHT: 68 IN | DIASTOLIC BLOOD PRESSURE: 82 MMHG | HEART RATE: 61 BPM | WEIGHT: 134 LBS | SYSTOLIC BLOOD PRESSURE: 138 MMHG | BODY MASS INDEX: 20.31 KG/M2

## 2023-07-05 DIAGNOSIS — I35.0 NONRHEUMATIC AORTIC VALVE STENOSIS: ICD-10-CM

## 2023-07-05 DIAGNOSIS — I10 ESSENTIAL HYPERTENSION: ICD-10-CM

## 2023-07-05 DIAGNOSIS — I48.19 PERSISTENT ATRIAL FIBRILLATION (HCC): Primary | ICD-10-CM

## 2023-07-05 DIAGNOSIS — I25.10 ATHEROSCLEROSIS OF NATIVE CORONARY ARTERY OF NATIVE HEART WITHOUT ANGINA PECTORIS: ICD-10-CM

## 2023-07-05 PROCEDURE — 99214 OFFICE O/P EST MOD 30 MIN: CPT | Performed by: INTERNAL MEDICINE

## 2023-07-05 PROCEDURE — G8420 CALC BMI NORM PARAMETERS: HCPCS | Performed by: INTERNAL MEDICINE

## 2023-07-05 PROCEDURE — 1036F TOBACCO NON-USER: CPT | Performed by: INTERNAL MEDICINE

## 2023-07-05 PROCEDURE — 93000 ELECTROCARDIOGRAM COMPLETE: CPT | Performed by: INTERNAL MEDICINE

## 2023-07-05 PROCEDURE — 1123F ACP DISCUSS/DSCN MKR DOCD: CPT | Performed by: INTERNAL MEDICINE

## 2023-07-05 PROCEDURE — G8427 DOCREV CUR MEDS BY ELIG CLIN: HCPCS | Performed by: INTERNAL MEDICINE

## 2023-07-05 ASSESSMENT — ENCOUNTER SYMPTOMS
BACK PAIN: 1
SHORTNESS OF BREATH: 0

## 2023-07-05 NOTE — PROGRESS NOTES
55-60%. Severe LAE. Moderate NATALIIA.  AVSC. Severe   eccentric MR. Mild to moderate TR. Social History     Tobacco Use    Smoking status: Former     Packs/day: 4.00     Years: 30.00     Pack years: 120.00     Types: Cigarettes     Quit date: 1980     Years since quittin.5    Smokeless tobacco: Never   Substance Use Topics    Alcohol use: No       ROS:    Review of Systems   Constitutional: Negative for malaise/fatigue. Cardiovascular:  Negative for chest pain. Respiratory:  Negative for shortness of breath. Musculoskeletal:  Positive for arthritis and back pain. Neurological:  Negative for focal weakness. Psychiatric/Behavioral:  Negative for depression. PHYSICAL EXAM:  Wt Readings from Last 3 Encounters:   23 134 lb (60.8 kg)   05/15/23 137 lb (62.1 kg)   02/15/23 138 lb (62.6 kg)     BP Readings from Last 3 Encounters:   23 138/82   05/15/23 136/60   02/15/23 (!) 171/81     Pulse Readings from Last 3 Encounters:   23 61   05/15/23 63   02/15/23 71       Physical Exam  Constitutional:       General: He is not in acute distress. Neck:      Vascular: No carotid bruit. Cardiovascular:      Rate and Rhythm: Normal rate. Rhythm irregular. Pulmonary:      Effort: No respiratory distress. Breath sounds: Normal breath sounds. Abdominal:      General: Bowel sounds are normal.      Palpations: Abdomen is soft. Musculoskeletal:         General: No swelling. Skin:     General: Skin is warm and dry. Neurological:      General: No focal deficit present. Psychiatric:         Mood and Affect: Mood normal.       Medical problems and test results were reviewed with the patient today.        Lab Results   Component Value Date    WBC 6.7 02/15/2023    HGB 12.0 (L) 02/15/2023    HCT 36.5 (L) 02/15/2023    MCV 95.8 02/15/2023     02/15/2023       Lab Results   Component Value Date/Time     02/15/2023 01:54 PM    K 4.3 02/15/2023 01:54 PM    CL

## 2023-07-23 NOTE — PROGRESS NOTES
CABG---- no mi/stents--- followed by dr. Cottrell Teddy    Carotid artery stenosis without cerebral infarction 2016    1. Right carotid stent 2015. Chronic systolic heart failure ([address]) 2016    Depression 2013    GERD (gastroesophageal reflux disease)     occ-- no meds    Glaucoma     HLD (hyperlipidemia)     HTN (hypertension)     controlled with med    Lumbar stenosis with neurogenic claudication 4/15/2015    Mild aortic stenosis by prior echocardiogram     echo dated 14    Mitral valve regurgitation 2016    Peripheral vascular disease ([address]) 2016    Pulmonary HTN ([address]) 2016    Pulmonary nodule 2012    Seen on CXR . CT showed a calcified granuloma but no nodule.     Spinal stenosis of thoracic region 2016    Syncope 2016        Tobacco Use      Smoking status: Former        Packs/day: 4.00        Years: 30.00        Pack years: 120        Types: Cigarettes        Quit date: 1980        Years since quittin.5      Smokeless tobacco: Never    Allergies   Allergen Reactions    Bimatoprost Anaphylaxis     And skinned peeled    Benzalkonium Other (See Comments)    Iodine Hives     Current Outpatient Medications   Medication Instructions    aspirin 81 mg, Oral, DAILY    azelastine (ASTELIN) 0.1 % nasal spray 1 spray, Nasal, 2 TIMES DAILY, Use in each nostril as directed    latanoprost (XALATAN) 0.005 % ophthalmic solution 1 drop, Ophthalmic    lisinopril (PRINIVIL;ZESTRIL) 20 mg, Oral, DAILY    loratadine (CLARITIN) 10 mg, Oral, DAILY    nitroGLYCERIN (NITROSTAT) 0.4 mg, SubLINGual

## 2023-07-24 ENCOUNTER — HOSPITAL ENCOUNTER (OUTPATIENT)
Dept: GENERAL RADIOLOGY | Age: 88
Discharge: HOME | End: 2023-07-27
Payer: MEDICARE

## 2023-07-24 ENCOUNTER — TELEPHONE (OUTPATIENT)
Dept: INTERNAL MEDICINE CLINIC | Facility: CLINIC | Age: 88
End: 2023-07-24

## 2023-07-24 ENCOUNTER — OFFICE VISIT (OUTPATIENT)
Dept: PULMONOLOGY | Age: 88
End: 2023-07-24
Payer: MEDICARE

## 2023-07-24 VITALS
SYSTOLIC BLOOD PRESSURE: 120 MMHG | WEIGHT: 138 LBS | HEART RATE: 78 BPM | RESPIRATION RATE: 20 BRPM | HEIGHT: 68 IN | TEMPERATURE: 98 F | OXYGEN SATURATION: 97 % | BODY MASS INDEX: 20.92 KG/M2 | DIASTOLIC BLOOD PRESSURE: 80 MMHG

## 2023-07-24 DIAGNOSIS — R93.89 ABNORMAL CXR: Primary | ICD-10-CM

## 2023-07-24 DIAGNOSIS — R06.02 SHORTNESS OF BREATH: Primary | ICD-10-CM

## 2023-07-24 DIAGNOSIS — Z87.891 HISTORY OF TOBACCO ABUSE: ICD-10-CM

## 2023-07-24 DIAGNOSIS — R63.4 WEIGHT LOSS: ICD-10-CM

## 2023-07-24 LAB
EXPIRATORY TIME: NORMAL
FEF 25-75% %PRED-PRE: NORMAL
FEF 25-75% PRED: NORMAL
FEF 25-75%-PRE: NORMAL
FEV1 %PRED-PRE: 93 %
FEV1 PRED: NORMAL
FEV1/FVC %PRED-PRE: NORMAL
FEV1/FVC PRED: NORMAL
FEV1/FVC: 80 %
FEV1: 2.15 L
FVC %PRED-PRE: 80 %
FVC PRED: NORMAL
FVC: 2.7 L
PEF %PRED-PRE: NORMAL
PEF PRED: NORMAL
PEF-PRE: NORMAL

## 2023-07-24 PROCEDURE — G8427 DOCREV CUR MEDS BY ELIG CLIN: HCPCS | Performed by: PHYSICIAN ASSISTANT

## 2023-07-24 PROCEDURE — G8420 CALC BMI NORM PARAMETERS: HCPCS | Performed by: PHYSICIAN ASSISTANT

## 2023-07-24 PROCEDURE — 1036F TOBACCO NON-USER: CPT | Performed by: PHYSICIAN ASSISTANT

## 2023-07-24 PROCEDURE — 71046 X-RAY EXAM CHEST 2 VIEWS: CPT

## 2023-07-24 PROCEDURE — 1123F ACP DISCUSS/DSCN MKR DOCD: CPT | Performed by: PHYSICIAN ASSISTANT

## 2023-07-24 PROCEDURE — 94010 BREATHING CAPACITY TEST: CPT | Performed by: INTERNAL MEDICINE

## 2023-07-24 PROCEDURE — 99203 OFFICE O/P NEW LOW 30 MIN: CPT | Performed by: PHYSICIAN ASSISTANT

## 2023-07-24 RX ORDER — LORATADINE 10 MG/1
10 TABLET ORAL DAILY
Qty: 90 TABLET | Refills: 3 | Status: SHIPPED | OUTPATIENT
Start: 2023-07-24

## 2023-07-24 RX ORDER — AZELASTINE 1 MG/ML
1 SPRAY, METERED NASAL 2 TIMES DAILY
Qty: 60 ML | Refills: 11 | Status: SHIPPED | OUTPATIENT
Start: 2023-07-24

## 2023-07-24 ASSESSMENT — PULMONARY FUNCTION TESTS
FEV1/FVC: 80
FEV1_PERCENT_PREDICTED_PRE: 93
FVC_PERCENT_PREDICTED_PRE: 80
FVC: 2.70
FEV1: 2.15

## 2023-07-24 NOTE — TELEPHONE ENCOUNTER
Spoke with patient advised  CXR with abnormality. They recommended CT chest (CT chest with contrast for abnormal CXR) to better evaluate.  Pt expressed understanding, informed him he would receive an call from Radiology to schedule imaging test.

## 2023-07-24 NOTE — TELEPHONE ENCOUNTER
----- Message from Guy Gaming MD sent at 7/24/2023 11:49 AM EDT -----  CXR with abnormality. They recommended CT chest (CT chest with contrast for abnormal CXR) to better evaluate.

## 2023-08-14 DIAGNOSIS — I10 ESSENTIAL HYPERTENSION: ICD-10-CM

## 2023-08-14 DIAGNOSIS — I50.22 CHRONIC SYSTOLIC HEART FAILURE (HCC): ICD-10-CM

## 2023-08-14 DIAGNOSIS — I25.810 CORONARY ARTERY DISEASE INVOLVING CORONARY BYPASS GRAFT OF NATIVE HEART WITHOUT ANGINA PECTORIS: ICD-10-CM

## 2023-08-14 RX ORDER — LISINOPRIL 20 MG/1
TABLET ORAL
Qty: 90 TABLET | Refills: 0 | OUTPATIENT
Start: 2023-08-14

## 2023-08-16 ENCOUNTER — HOSPITAL ENCOUNTER (OUTPATIENT)
Dept: CT IMAGING | Age: 88
Discharge: HOME | End: 2023-08-19
Attending: INTERNAL MEDICINE
Payer: MEDICARE

## 2023-08-16 DIAGNOSIS — R93.89 ABNORMAL CXR: ICD-10-CM

## 2023-08-16 PROCEDURE — 71250 CT THORAX DX C-: CPT

## 2023-09-27 DIAGNOSIS — I25.810 CORONARY ARTERY DISEASE INVOLVING CORONARY BYPASS GRAFT OF NATIVE HEART WITHOUT ANGINA PECTORIS: ICD-10-CM

## 2023-09-27 DIAGNOSIS — I10 ESSENTIAL HYPERTENSION: ICD-10-CM

## 2023-09-27 DIAGNOSIS — I50.22 CHRONIC SYSTOLIC HEART FAILURE (HCC): ICD-10-CM

## 2023-09-27 RX ORDER — LISINOPRIL 20 MG/1
20 TABLET ORAL DAILY
Qty: 90 TABLET | Refills: 0 | OUTPATIENT
Start: 2023-09-27

## 2023-10-02 ENCOUNTER — HOSPITAL ENCOUNTER (OUTPATIENT)
Dept: MRI IMAGING | Age: 88
Discharge: HOME | End: 2023-10-05
Payer: MEDICARE

## 2023-10-02 DIAGNOSIS — M54.16 LUMBAR RADICULOPATHY: ICD-10-CM

## 2023-10-02 PROCEDURE — A9579 GAD-BASE MR CONTRAST NOS,1ML: HCPCS | Performed by: INTERNAL MEDICINE

## 2023-10-02 PROCEDURE — 72158 MRI LUMBAR SPINE W/O & W/DYE: CPT

## 2023-10-02 PROCEDURE — 6360000004 HC RX CONTRAST MEDICATION: Performed by: INTERNAL MEDICINE

## 2023-10-02 PROCEDURE — 2580000003 HC RX 258: Performed by: INTERNAL MEDICINE

## 2023-10-02 RX ORDER — SODIUM CHLORIDE 0.9 % (FLUSH) 0.9 %
10 SYRINGE (ML) INJECTION AS NEEDED
Status: DISCONTINUED | OUTPATIENT
Start: 2023-10-02 | End: 2023-10-06 | Stop reason: HOSPADM

## 2023-10-02 RX ADMIN — GADOTERIDOL 12 ML: 279.3 INJECTION, SOLUTION INTRAVENOUS at 14:59

## 2023-10-02 RX ADMIN — SODIUM CHLORIDE, PRESERVATIVE FREE 10 ML: 5 INJECTION INTRAVENOUS at 14:59

## 2023-10-13 DIAGNOSIS — I50.22 CHRONIC SYSTOLIC HEART FAILURE (HCC): ICD-10-CM

## 2023-10-13 DIAGNOSIS — I25.810 CORONARY ARTERY DISEASE INVOLVING CORONARY BYPASS GRAFT OF NATIVE HEART WITHOUT ANGINA PECTORIS: ICD-10-CM

## 2023-10-13 DIAGNOSIS — I10 ESSENTIAL HYPERTENSION: ICD-10-CM

## 2023-10-16 RX ORDER — LISINOPRIL 20 MG/1
20 TABLET ORAL DAILY
Qty: 90 TABLET | Refills: 0 | OUTPATIENT
Start: 2023-10-16

## 2023-10-26 ENCOUNTER — TELEPHONE (OUTPATIENT)
Dept: INTERNAL MEDICINE CLINIC | Facility: CLINIC | Age: 88
End: 2023-10-26

## 2023-10-26 DIAGNOSIS — M54.9 BACK PAIN, UNSPECIFIED BACK LOCATION, UNSPECIFIED BACK PAIN LATERALITY, UNSPECIFIED CHRONICITY: Primary | ICD-10-CM

## 2023-10-26 NOTE — TELEPHONE ENCOUNTER
----- Message from Sulaiman Joy sent at 10/26/2023 12:36 PM EDT -----  Subject: Referral Request    Reason for referral request? Patient would [facility] get [facility] with who ever he can get in with the   soonest. Please call the patient to advise  Provider patient wants to be referred to(if known):     Provider Phone Number(if known):[phone]    Additional Information for Provider?   ---------------------------------------------------------------------------  --------------  [address]    [phone]; OK to leave message on voicemail  ---------------------------------------------------------------------------  --------------

## 2023-10-30 NOTE — TELEPHONE ENCOUNTER
I have called the patient this afternoon and he stated that he was having back pain and was wanting the referral for that.

## 2024-01-05 ENCOUNTER — OFFICE VISIT (OUTPATIENT)
Age: 89
End: 2024-01-05
Payer: MEDICARE

## 2024-01-05 VITALS
WEIGHT: 135.2 LBS | HEIGHT: 68 IN | BODY MASS INDEX: 20.49 KG/M2 | HEART RATE: 62 BPM | SYSTOLIC BLOOD PRESSURE: 138 MMHG | DIASTOLIC BLOOD PRESSURE: 80 MMHG

## 2024-01-05 DIAGNOSIS — I10 ESSENTIAL HYPERTENSION: ICD-10-CM

## 2024-01-05 DIAGNOSIS — I48.19 PERSISTENT ATRIAL FIBRILLATION (HCC): ICD-10-CM

## 2024-01-05 DIAGNOSIS — I34.0 NONRHEUMATIC MITRAL VALVE REGURGITATION: ICD-10-CM

## 2024-01-05 DIAGNOSIS — I25.10 ATHEROSCLEROSIS OF NATIVE CORONARY ARTERY OF NATIVE HEART WITHOUT ANGINA PECTORIS: ICD-10-CM

## 2024-01-05 DIAGNOSIS — I50.22 CHRONIC SYSTOLIC HEART FAILURE (HCC): Primary | ICD-10-CM

## 2024-01-05 PROCEDURE — 1123F ACP DISCUSS/DSCN MKR DOCD: CPT | Performed by: INTERNAL MEDICINE

## 2024-01-05 PROCEDURE — 1036F TOBACCO NON-USER: CPT | Performed by: INTERNAL MEDICINE

## 2024-01-05 PROCEDURE — G8484 FLU IMMUNIZE NO ADMIN: HCPCS | Performed by: INTERNAL MEDICINE

## 2024-01-05 PROCEDURE — G8427 DOCREV CUR MEDS BY ELIG CLIN: HCPCS | Performed by: INTERNAL MEDICINE

## 2024-01-05 PROCEDURE — G8420 CALC BMI NORM PARAMETERS: HCPCS | Performed by: INTERNAL MEDICINE

## 2024-01-05 PROCEDURE — 99214 OFFICE O/P EST MOD 30 MIN: CPT | Performed by: INTERNAL MEDICINE

## 2024-01-05 ASSESSMENT — ENCOUNTER SYMPTOMS
SHORTNESS OF BREATH: 0
BACK PAIN: 1

## 2024-01-05 NOTE — PROGRESS NOTES
[facility], [address], [address]  [place] [address]  PHONE: [phone]    Klaus Cochran  1935      SUBJECTIVE:   Klaus Cochran is a 88 y.o. male seen for a follow up visit regarding the following:     Chief Complaint   Patient presents with    Atrial Fibrillation    Coronary Artery Disease    Hypertension       HPI:    Expand All Collapse All    [address], [address] [place] [address]  PHONE: [phone]     Klaus Cochran  1935        SUBJECTIVE:   Klaus Cochran is a 87 y.o. male seen for a follow up visit regarding the following:          Chief Complaint   Patient presents with    Atrial Fibrillation    Coronary Artery Disease    Hypertension         HPI:     Klaus Cochran presents for routine follow up.     Coronary Artery Disease:  Patient denies any recent angina.  Notes compliance with medical therapy.  No recent NTG use.       Persistent atrial fibrillation: Denies palpitation or tachycardia.  No neurologic symptoms consistent with TIA or stroke.     Hypertension:  Ambulatory BP readings have been controlled.  Patient reports compliance with medical therapy without side effects.       Hyperlipidemia:   Unable to take statins.      Back pain: Remains his largest limitation.  Poorly controlled.     Weight loss: Weight stable.                 Past Medical History, Past Surgical History, Family history, Social History, and Medications were all reviewed with the patient today and updated as necessary.           Current Outpatient Medications:     azelastine (ASTELIN) 0.1 % nasal spray, 1 spray by Nasal route 2 times daily Use in each nostril as directed, Disp: 60 mL, Rfl: 11    loratadine (CLARITIN) 10 MG tablet, Take 1 tablet by mouth daily, Disp: 90 tablet, Rfl: 3    lisinopril (PRINIVIL;ZESTRIL) 20 MG tablet, Take 1 tablet by mouth daily, Disp: 90 tablet, Rfl: 0    aspirin 81 MG EC tablet, Take 1 tablet by mouth daily, Disp: ,

## 2024-03-25 ENCOUNTER — HOME HEALTH ADMISSION (OUTPATIENT)
Dept: HOME HEALTH SERVICES | Facility: HOME HEALTH | Age: 89
End: 2024-03-25
Payer: MEDICARE

## 2024-03-25 ENCOUNTER — OFFICE VISIT (OUTPATIENT)
Dept: INTERNAL MEDICINE CLINIC | Facility: CLINIC | Age: 89
End: 2024-03-25
Payer: MEDICARE

## 2024-03-25 VITALS
HEIGHT: 68 IN | BODY MASS INDEX: 18.49 KG/M2 | SYSTOLIC BLOOD PRESSURE: 131 MMHG | DIASTOLIC BLOOD PRESSURE: 71 MMHG | HEART RATE: 60 BPM | TEMPERATURE: 97.2 F | OXYGEN SATURATION: 100 % | WEIGHT: 122 LBS

## 2024-03-25 DIAGNOSIS — I48.19 PERSISTENT ATRIAL FIBRILLATION (HCC): ICD-10-CM

## 2024-03-25 DIAGNOSIS — R26.89 IMBALANCE: ICD-10-CM

## 2024-03-25 DIAGNOSIS — R97.20 ELEVATED PSA: ICD-10-CM

## 2024-03-25 DIAGNOSIS — I50.22 CHRONIC SYSTOLIC HEART FAILURE (HCC): ICD-10-CM

## 2024-03-25 DIAGNOSIS — I73.9 PERIPHERAL VASCULAR DISEASE (HCC): ICD-10-CM

## 2024-03-25 DIAGNOSIS — I10 ESSENTIAL HYPERTENSION: ICD-10-CM

## 2024-03-25 DIAGNOSIS — M54.50 CHRONIC BILATERAL LOW BACK PAIN WITHOUT SCIATICA: ICD-10-CM

## 2024-03-25 DIAGNOSIS — N18.31 STAGE 3A CHRONIC KIDNEY DISEASE (HCC): ICD-10-CM

## 2024-03-25 DIAGNOSIS — R63.4 WEIGHT LOSS: ICD-10-CM

## 2024-03-25 DIAGNOSIS — E78.2 MIXED HYPERLIPIDEMIA: ICD-10-CM

## 2024-03-25 DIAGNOSIS — Z91.81 AT HIGH RISK FOR FALLS: ICD-10-CM

## 2024-03-25 DIAGNOSIS — I25.810 CORONARY ARTERY DISEASE INVOLVING CORONARY BYPASS GRAFT OF NATIVE HEART WITHOUT ANGINA PECTORIS: ICD-10-CM

## 2024-03-25 DIAGNOSIS — G89.29 CHRONIC BILATERAL LOW BACK PAIN WITHOUT SCIATICA: ICD-10-CM

## 2024-03-25 DIAGNOSIS — Z00.00 MEDICARE ANNUAL WELLNESS VISIT, SUBSEQUENT: Primary | ICD-10-CM

## 2024-03-25 LAB
ALBUMIN SERPL-MCNC: 3.9 G/DL (ref 3.2–4.6)
ALBUMIN/GLOB SERPL: 1.2 (ref 0.4–1.6)
ALP SERPL-CCNC: 66 U/L (ref 50–136)
ALT SERPL-CCNC: 15 U/L (ref 12–65)
ANION GAP SERPL CALC-SCNC: 5 MMOL/L (ref 2–11)
APPEARANCE UR: CLEAR
AST SERPL-CCNC: 17 U/L (ref 15–37)
BASOPHILS # BLD: 0 K/UL (ref 0–0.2)
BASOPHILS NFR BLD: 1 % (ref 0–2)
BILIRUB DIRECT SERPL-MCNC: 0.5 MG/DL
BILIRUB SERPL-MCNC: 1.1 MG/DL (ref 0.2–1.1)
BILIRUB UR QL: NEGATIVE
BUN SERPL-MCNC: 33 MG/DL (ref 8–23)
CALCIUM SERPL-MCNC: 9.3 MG/DL (ref 8.3–10.4)
CHLORIDE SERPL-SCNC: 110 MMOL/L (ref 103–113)
CHOLEST SERPL-MCNC: 150 MG/DL
CO2 SERPL-SCNC: 24 MMOL/L (ref 21–32)
COLOR UR: NORMAL
CREAT SERPL-MCNC: 1.9 MG/DL (ref 0.8–1.5)
DIFFERENTIAL METHOD BLD: ABNORMAL
EOSINOPHIL # BLD: 0.4 K/UL (ref 0–0.8)
EOSINOPHIL NFR BLD: 6 % (ref 0.5–7.8)
ERYTHROCYTE [DISTWIDTH] IN BLOOD BY AUTOMATED COUNT: 15 % (ref 11.9–14.6)
GLOBULIN SER CALC-MCNC: 3.2 G/DL (ref 2.8–4.5)
GLUCOSE SERPL-MCNC: 85 MG/DL (ref 65–100)
GLUCOSE UR STRIP.AUTO-MCNC: NEGATIVE MG/DL
HCT VFR BLD AUTO: 40.7 % (ref 41.1–50.3)
HDLC SERPL-MCNC: 53 MG/DL (ref 40–60)
HDLC SERPL: 2.8
HGB BLD-MCNC: 12.8 G/DL (ref 13.6–17.2)
HGB UR QL STRIP: NEGATIVE
IMM GRANULOCYTES # BLD AUTO: 0 K/UL (ref 0–0.5)
IMM GRANULOCYTES NFR BLD AUTO: 0 % (ref 0–5)
KETONES UR QL STRIP.AUTO: NEGATIVE MG/DL
LDLC SERPL CALC-MCNC: 87.8 MG/DL
LEUKOCYTE ESTERASE UR QL STRIP.AUTO: NEGATIVE
LYMPHOCYTES # BLD: 0.8 K/UL (ref 0.5–4.6)
LYMPHOCYTES NFR BLD: 14 % (ref 13–44)
MCH RBC QN AUTO: 30.3 PG (ref 26.1–32.9)
MCHC RBC AUTO-ENTMCNC: 31.4 G/DL (ref 31.4–35)
MCV RBC AUTO: 96.4 FL (ref 82–102)
MONOCYTES # BLD: 0.6 K/UL (ref 0.1–1.3)
MONOCYTES NFR BLD: 10 % (ref 4–12)
NEUTS SEG # BLD: 4 K/UL (ref 1.7–8.2)
NEUTS SEG NFR BLD: 69 % (ref 43–78)
NITRITE UR QL STRIP.AUTO: NEGATIVE
NRBC # BLD: 0 K/UL (ref 0–0.2)
PH UR STRIP: 5 (ref 5–9)
PLATELET # BLD AUTO: 165 K/UL (ref 150–450)
PMV BLD AUTO: 10.7 FL (ref 9.4–12.3)
POTASSIUM SERPL-SCNC: 4.6 MMOL/L (ref 3.5–5.1)
PROT SERPL-MCNC: 7.1 G/DL (ref 6.3–8.2)
PROT UR STRIP-MCNC: NEGATIVE MG/DL
RBC # BLD AUTO: 4.22 M/UL (ref 4.23–5.6)
SODIUM SERPL-SCNC: 139 MMOL/L (ref 136–146)
SP GR UR REFRACTOMETRY: 1.02 (ref 1–1.02)
TRIGL SERPL-MCNC: 46 MG/DL (ref 35–150)
TSH, 3RD GENERATION: 5.59 UIU/ML (ref 0.36–3.74)
UROBILINOGEN UR QL STRIP.AUTO: 1 EU/DL (ref 0.2–1)
VLDLC SERPL CALC-MCNC: 9.2 MG/DL (ref 6–23)
WBC # BLD AUTO: 5.9 K/UL (ref 4.3–11.1)

## 2024-03-25 PROCEDURE — G0439 PPPS, SUBSEQ VISIT: HCPCS | Performed by: INTERNAL MEDICINE

## 2024-03-25 PROCEDURE — G8427 DOCREV CUR MEDS BY ELIG CLIN: HCPCS | Performed by: INTERNAL MEDICINE

## 2024-03-25 PROCEDURE — 1123F ACP DISCUSS/DSCN MKR DOCD: CPT | Performed by: INTERNAL MEDICINE

## 2024-03-25 PROCEDURE — G8420 CALC BMI NORM PARAMETERS: HCPCS | Performed by: INTERNAL MEDICINE

## 2024-03-25 PROCEDURE — 99214 OFFICE O/P EST MOD 30 MIN: CPT | Performed by: INTERNAL MEDICINE

## 2024-03-25 PROCEDURE — G8484 FLU IMMUNIZE NO ADMIN: HCPCS | Performed by: INTERNAL MEDICINE

## 2024-03-25 PROCEDURE — 1036F TOBACCO NON-USER: CPT | Performed by: INTERNAL MEDICINE

## 2024-03-25 RX ORDER — TRAMADOL HYDROCHLORIDE 50 MG/1
1 TABLET ORAL EVERY 6 HOURS PRN
COMMUNITY
Start: 2024-02-23

## 2024-03-25 RX ORDER — LIDOCAINE 50 MG/G
2 PATCH TOPICAL DAILY
COMMUNITY
Start: 2024-03-14

## 2024-03-25 SDOH — ECONOMIC STABILITY: FOOD INSECURITY: WITHIN THE PAST 12 MONTHS, THE FOOD YOU BOUGHT JUST DIDN'T LAST AND YOU DIDN'T HAVE MONEY TO GET MORE.: NEVER TRUE

## 2024-03-25 SDOH — ECONOMIC STABILITY: INCOME INSECURITY: HOW HARD IS IT FOR YOU TO PAY FOR THE VERY BASICS LIKE FOOD, HOUSING, MEDICAL CARE, AND HEATING?: NOT HARD AT ALL

## 2024-03-25 SDOH — ECONOMIC STABILITY: FOOD INSECURITY: WITHIN THE PAST 12 MONTHS, YOU WORRIED THAT YOUR FOOD WOULD RUN OUT BEFORE YOU GOT MONEY TO BUY MORE.: NEVER TRUE

## 2024-03-25 ASSESSMENT — PATIENT HEALTH QUESTIONNAIRE - PHQ9
SUM OF ALL RESPONSES TO PHQ QUESTIONS 1-9: 0
SUM OF ALL RESPONSES TO PHQ9 QUESTIONS 1 & 2: 0
1. LITTLE INTEREST OR PLEASURE IN DOING THINGS: NOT AT ALL
SUM OF ALL RESPONSES TO PHQ QUESTIONS 1-9: 0
2. FEELING DOWN, DEPRESSED OR HOPELESS: NOT AT ALL
SUM OF ALL RESPONSES TO PHQ QUESTIONS 1-9: 0
SUM OF ALL RESPONSES TO PHQ QUESTIONS 1-9: 0

## 2024-03-25 ASSESSMENT — ANXIETY QUESTIONNAIRES
6. BECOMING EASILY ANNOYED OR IRRITABLE: NOT AT ALL
5. BEING SO RESTLESS THAT IT IS HARD TO SIT STILL: NOT AT ALL
IF YOU CHECKED OFF ANY PROBLEMS ON THIS QUESTIONNAIRE, HOW DIFFICULT HAVE THESE PROBLEMS MADE IT FOR YOU TO DO YOUR WORK, TAKE CARE OF THINGS AT HOME, OR GET ALONG WITH OTHER PEOPLE: NOT DIFFICULT AT ALL
7. FEELING AFRAID AS IF SOMETHING AWFUL MIGHT HAPPEN: NOT AT ALL
GAD7 TOTAL SCORE: 0
1. FEELING NERVOUS, ANXIOUS, OR ON EDGE: NOT AT ALL
3. WORRYING TOO MUCH ABOUT DIFFERENT THINGS: NOT AT ALL
4. TROUBLE RELAXING: NOT AT ALL
2. NOT BEING ABLE TO STOP OR CONTROL WORRYING: NOT AT ALL

## 2024-03-25 ASSESSMENT — ENCOUNTER SYMPTOMS
BLOOD IN STOOL: 0
SHORTNESS OF BREATH: 0

## 2024-03-25 ASSESSMENT — LIFESTYLE VARIABLES: HOW MANY STANDARD DRINKS CONTAINING ALCOHOL DO YOU HAVE ON A TYPICAL DAY: PATIENT DOES NOT DRINK

## 2024-03-25 NOTE — PATIENT INSTRUCTIONS
Please arrive 20 minutes prior to your scheduled time to see the doctor to allow sufficient time for check-in and rooming.      Please bring all your prescription bottles to each appointment.      I recommend all standard healthcare maintenance and cancer screenings (Colon cancer screening if due, Lung cancer screening if due, Mammogram and Bone Density if female and appropriate, etc) and standard immunizations (Flu, Pneumonia, Covid-19, Shingrix, Tetanus boosters, etc) as appropriate and due to lower your risk for potentially preventable morbidity/mortality from these diseases.     Check BP 1-2 times per week.  Call our office if BP runs above 140/80.     Recommend tobacco cessation if you use tobacco products.       Preventing Falls: Care Instructions  Injuries and health problems such as trouble walking or poor eyesight can increase your risk of falling. So can some medicines. But there are things you can do to help prevent falls. You can exercise to get stronger. You can also arrange your home to make it safer.    Talk to your doctor about the medicines you take. Ask if any of them increase the risk of falls and whether they can be changed or stopped.   Try to exercise regularly. It can help improve your strength and balance. This can help lower your risk of falling.     Practice fall safety and prevention.    Wear low-heeled shoes that fit well and give your feet good support. Talk to your doctor if you have foot problems that make this hard.  Carry a cellphone or wear a medical alert device that you can use to call for help.  Use stepladders instead of chairs to reach high objects. Don't climb if you're at risk for falls. Ask for help, if needed.  Wear the correct eyeglasses, if you need them.    Make your home safer.    Remove rugs, cords, clutter, and furniture from walkways.  Keep your house well lit. Use night-lights in hallways and bathrooms.  Install and use sturdy handrails on stairways.  Wear nonskid

## 2024-03-25 NOTE — PROGRESS NOTES
Tenderness: There is no abdominal tenderness. There is no guarding or rebound.   Musculoskeletal:      Right lower leg: No edema.      Left lower leg: No edema.   Skin:     General: Skin is warm and dry.      Coloration: Skin is not jaundiced or pale.      Findings: No erythema.   Neurological:      General: No focal deficit present.      Mental Status: He is alert and oriented to person, place, and time. Mental status is at baseline.   Psychiatric:         Mood and Affect: Mood normal.         Behavior: Behavior normal.         Thought Content: Thought content normal.         Judgment: Judgment normal.        ASSESSMENT AND PLAN:    CAD: CABG x 5 by Dr. Mills 12/1998.  Follows with Cardiology (Dr. Waggoner).  Maintained on Asa.  Risk factors medically modified per below.  Taking meds w/o probs.  Asymptomatic w/o angina or GUTIERREZ.  Well controlled.  Continue Asa.  Continue risk factor modification per below.  Follow up with Dr. Waggoner. (Also with PVD, CHF, and AFib)   HTN: Non-compliant with current regimen (Lisinopril 20).  Home BPs = Not checking.  Well controlled.  Continue current regimen.  Asked to monitor BP at home, call me if it runs above 140/80, and bring in a log next time.   HLD: Intolerant to Statins (myalgias) and refuses Repatha.  Thus, uncontrolled.  Recommend Statin and/or Repatha to a goal LDL of 70 to lower risk for morbidity/mortality.   FLPs:   12/14/12 Untreated = [224/166/38/99].   2/15/23 Untreated = [169/107.2/49/64].   3/25/24 Untreated = [  CKD3: Baseline Cr ? 1.1-1.5 and stable.  Stable and asymptomatic.  Continue to monitor.  Counseled to avoid NSAIDs.   HCM:   Immunizations: Refuses standard immunizations which I recommend to lower risk for morbidity/mortality.   Prostate:   PSA's:  1/22/14 = 0.5.   6/18/19 = 4.3.   2/15/23 = 5.610.   3/25/24 =   F/u: 3 Months.  Non-fasting labs at that visit.   Weight Loss: Declines GI eval and additional imaging.  Will check labs.  He has CHF and

## 2024-03-27 ENCOUNTER — HOME CARE VISIT (OUTPATIENT)
Dept: SCHEDULING | Facility: HOME HEALTH | Age: 89
End: 2024-03-27

## 2024-03-27 VITALS
HEART RATE: 60 BPM | RESPIRATION RATE: 17 BRPM | DIASTOLIC BLOOD PRESSURE: 76 MMHG | TEMPERATURE: 98.8 F | OXYGEN SATURATION: 99 % | SYSTOLIC BLOOD PRESSURE: 126 MMHG

## 2024-03-27 LAB — PSA SERPL DL<=0.01 NG/ML-MCNC: 6.47 NG/ML (ref 0–4)

## 2024-03-27 PROCEDURE — 0221000100 HH NO PAY CLAIM PROCEDURE

## 2024-03-27 PROCEDURE — G0151 HHCP-SERV OF PT,EA 15 MIN: HCPCS

## 2024-03-27 ASSESSMENT — ENCOUNTER SYMPTOMS
DYSPNEA ACTIVITY LEVEL: AFTER AMBULATING LESS THAN 10 FT
TROUBLE SWALLOWING: 1

## 2024-04-02 ENCOUNTER — HOME CARE VISIT (OUTPATIENT)
Dept: SCHEDULING | Facility: HOME HEALTH | Age: 89
End: 2024-04-02
Payer: MEDICARE

## 2024-04-02 VITALS
TEMPERATURE: 97.8 F | RESPIRATION RATE: 16 BRPM | DIASTOLIC BLOOD PRESSURE: 60 MMHG | SYSTOLIC BLOOD PRESSURE: 110 MMHG | HEART RATE: 64 BPM | OXYGEN SATURATION: 96 %

## 2024-04-02 PROCEDURE — G0157 HHC PT ASSISTANT EA 15: HCPCS

## 2024-04-04 ENCOUNTER — HOME CARE VISIT (OUTPATIENT)
Dept: SCHEDULING | Facility: HOME HEALTH | Age: 89
End: 2024-04-04
Payer: MEDICARE

## 2024-04-04 VITALS
SYSTOLIC BLOOD PRESSURE: 120 MMHG | RESPIRATION RATE: 16 BRPM | TEMPERATURE: 97.8 F | OXYGEN SATURATION: 96 % | HEART RATE: 64 BPM | DIASTOLIC BLOOD PRESSURE: 70 MMHG

## 2024-04-04 PROCEDURE — G0157 HHC PT ASSISTANT EA 15: HCPCS

## 2024-04-04 ASSESSMENT — ENCOUNTER SYMPTOMS: PAIN LOCATION - PAIN QUALITY: ACHES

## 2024-04-09 ENCOUNTER — HOME CARE VISIT (OUTPATIENT)
Dept: SCHEDULING | Facility: HOME HEALTH | Age: 89
End: 2024-04-09
Payer: MEDICARE

## 2024-04-09 VITALS
HEART RATE: 60 BPM | DIASTOLIC BLOOD PRESSURE: 70 MMHG | OXYGEN SATURATION: 97 % | SYSTOLIC BLOOD PRESSURE: 118 MMHG | TEMPERATURE: 97.8 F | RESPIRATION RATE: 16 BRPM

## 2024-04-09 PROCEDURE — G0157 HHC PT ASSISTANT EA 15: HCPCS

## 2024-04-11 ENCOUNTER — HOME CARE VISIT (OUTPATIENT)
Dept: SCHEDULING | Facility: HOME HEALTH | Age: 89
End: 2024-04-11
Payer: MEDICARE

## 2024-04-11 VITALS
RESPIRATION RATE: 16 BRPM | HEART RATE: 74 BPM | DIASTOLIC BLOOD PRESSURE: 70 MMHG | TEMPERATURE: 97.8 F | SYSTOLIC BLOOD PRESSURE: 128 MMHG | OXYGEN SATURATION: 96 %

## 2024-04-11 PROCEDURE — G0157 HHC PT ASSISTANT EA 15: HCPCS

## 2024-04-11 ASSESSMENT — ENCOUNTER SYMPTOMS: PAIN LOCATION - PAIN QUALITY: ACHES

## 2024-04-12 ENCOUNTER — HOME CARE VISIT (OUTPATIENT)
Dept: HOME HEALTH SERVICES | Facility: HOME HEALTH | Age: 89
End: 2024-04-12
Payer: MEDICARE

## 2024-04-17 ENCOUNTER — HOME CARE VISIT (OUTPATIENT)
Dept: SCHEDULING | Facility: HOME HEALTH | Age: 89
End: 2024-04-17
Payer: MEDICARE

## 2024-04-17 VITALS
SYSTOLIC BLOOD PRESSURE: 126 MMHG | HEART RATE: 64 BPM | OXYGEN SATURATION: 97 % | TEMPERATURE: 97.8 F | RESPIRATION RATE: 16 BRPM | DIASTOLIC BLOOD PRESSURE: 70 MMHG

## 2024-04-17 PROCEDURE — G0151 HHCP-SERV OF PT,EA 15 MIN: HCPCS

## 2024-04-17 ASSESSMENT — ENCOUNTER SYMPTOMS
PAIN LOCATION - PAIN QUALITY: SQUEEZING
DYSPNEA ACTIVITY LEVEL: AFTER AMBULATING 10 - 20 FT

## 2024-05-20 ENCOUNTER — OFFICE VISIT (OUTPATIENT)
Dept: INTERNAL MEDICINE CLINIC | Facility: CLINIC | Age: 89
End: 2024-05-20
Payer: MEDICARE

## 2024-05-20 VITALS
WEIGHT: 139 LBS | BODY MASS INDEX: 21.07 KG/M2 | SYSTOLIC BLOOD PRESSURE: 109 MMHG | TEMPERATURE: 97.4 F | HEIGHT: 68 IN | OXYGEN SATURATION: 94 % | HEART RATE: 60 BPM | DIASTOLIC BLOOD PRESSURE: 60 MMHG

## 2024-05-20 DIAGNOSIS — R06.09 DOE (DYSPNEA ON EXERTION): ICD-10-CM

## 2024-05-20 DIAGNOSIS — M79.89 LEG SWELLING: ICD-10-CM

## 2024-05-20 DIAGNOSIS — M79.89 LEG SWELLING: Primary | ICD-10-CM

## 2024-05-20 LAB
ALBUMIN SERPL-MCNC: 3.7 G/DL (ref 3.2–4.6)
ALBUMIN/GLOB SERPL: 1.2 (ref 1–1.9)
ALP SERPL-CCNC: 68 U/L (ref 40–129)
ALT SERPL-CCNC: 10 U/L (ref 12–65)
ANION GAP SERPL CALC-SCNC: 15 MMOL/L (ref 9–18)
APPEARANCE UR: ABNORMAL
AST SERPL-CCNC: 24 U/L (ref 15–37)
BACTERIA URNS QL MICRO: ABNORMAL /HPF
BASOPHILS # BLD: 0.1 K/UL (ref 0–0.2)
BASOPHILS NFR BLD: 1 % (ref 0–2)
BILIRUB DIRECT SERPL-MCNC: 0.7 MG/DL (ref 0–0.4)
BILIRUB SERPL-MCNC: 1.3 MG/DL (ref 0–1.2)
BILIRUB UR QL: NEGATIVE
BUN SERPL-MCNC: 21 MG/DL (ref 8–23)
CALCIUM SERPL-MCNC: 9.3 MG/DL (ref 8.8–10.2)
CASTS URNS QL MICRO: ABNORMAL /LPF
CHLORIDE SERPL-SCNC: 100 MMOL/L (ref 98–107)
CO2 SERPL-SCNC: 23 MMOL/L (ref 20–28)
COLOR UR: ABNORMAL
CREAT SERPL-MCNC: 1.79 MG/DL (ref 0.8–1.3)
CRYSTALS URNS QL MICRO: ABNORMAL /LPF
DIFFERENTIAL METHOD BLD: ABNORMAL
EOSINOPHIL # BLD: 0.2 K/UL (ref 0–0.8)
EOSINOPHIL NFR BLD: 4 % (ref 0.5–7.8)
EPI CELLS #/AREA URNS HPF: ABNORMAL /HPF
ERYTHROCYTE [DISTWIDTH] IN BLOOD BY AUTOMATED COUNT: 17.1 % (ref 11.9–14.6)
GLOBULIN SER CALC-MCNC: 3.1 G/DL (ref 2.3–3.5)
GLUCOSE SERPL-MCNC: 70 MG/DL (ref 70–99)
GLUCOSE UR STRIP.AUTO-MCNC: NEGATIVE MG/DL
HCT VFR BLD AUTO: 39.8 % (ref 41.1–50.3)
HGB BLD-MCNC: 12.5 G/DL (ref 13.6–17.2)
HGB UR QL STRIP: NEGATIVE
IMM GRANULOCYTES # BLD AUTO: 0 K/UL (ref 0–0.5)
IMM GRANULOCYTES NFR BLD AUTO: 0 % (ref 0–5)
KETONES UR QL STRIP.AUTO: ABNORMAL MG/DL
LEUKOCYTE ESTERASE UR QL STRIP.AUTO: NEGATIVE
LYMPHOCYTES # BLD: 1 K/UL (ref 0.5–4.6)
LYMPHOCYTES NFR BLD: 18 % (ref 13–44)
MCH RBC QN AUTO: 30 PG (ref 26.1–32.9)
MCHC RBC AUTO-ENTMCNC: 31.4 G/DL (ref 31.4–35)
MCV RBC AUTO: 95.4 FL (ref 82–102)
MONOCYTES # BLD: 0.8 K/UL (ref 0.1–1.3)
MONOCYTES NFR BLD: 13 % (ref 4–12)
NEUTS SEG # BLD: 3.7 K/UL (ref 1.7–8.2)
NEUTS SEG NFR BLD: 64 % (ref 43–78)
NITRITE UR QL STRIP.AUTO: NEGATIVE
NRBC # BLD: 0 K/UL (ref 0–0.2)
NT PRO BNP: ABNORMAL PG/ML (ref 0–450)
OTHER OBSERVATIONS: ABNORMAL
PH UR STRIP: 5.5 (ref 5–9)
PLATELET # BLD AUTO: 185 K/UL (ref 150–450)
PMV BLD AUTO: 10.5 FL (ref 9.4–12.3)
POTASSIUM SERPL-SCNC: 4 MMOL/L (ref 3.5–5.1)
PROT SERPL-MCNC: 6.8 G/DL (ref 6.3–8.2)
PROT UR STRIP-MCNC: ABNORMAL MG/DL
RBC # BLD AUTO: 4.17 M/UL (ref 4.23–5.6)
RBC #/AREA URNS HPF: ABNORMAL /HPF
SODIUM SERPL-SCNC: 137 MMOL/L (ref 136–145)
SP GR UR REFRACTOMETRY: 1.02 (ref 1–1.02)
TSH, 3RD GENERATION: 6.86 UIU/ML (ref 0.27–4.2)
UROBILINOGEN UR QL STRIP.AUTO: 1 EU/DL (ref 0.2–1)
WBC # BLD AUTO: 5.8 K/UL (ref 4.3–11.1)
WBC URNS QL MICRO: ABNORMAL /HPF

## 2024-05-20 PROCEDURE — G8428 CUR MEDS NOT DOCUMENT: HCPCS | Performed by: INTERNAL MEDICINE

## 2024-05-20 PROCEDURE — G8420 CALC BMI NORM PARAMETERS: HCPCS | Performed by: INTERNAL MEDICINE

## 2024-05-20 PROCEDURE — 99214 OFFICE O/P EST MOD 30 MIN: CPT | Performed by: INTERNAL MEDICINE

## 2024-05-20 PROCEDURE — 1123F ACP DISCUSS/DSCN MKR DOCD: CPT | Performed by: INTERNAL MEDICINE

## 2024-05-20 PROCEDURE — 1036F TOBACCO NON-USER: CPT | Performed by: INTERNAL MEDICINE

## 2024-05-20 SDOH — ECONOMIC STABILITY: FOOD INSECURITY: WITHIN THE PAST 12 MONTHS, THE FOOD YOU BOUGHT JUST DIDN'T LAST AND YOU DIDN'T HAVE MONEY TO GET MORE.: NEVER TRUE

## 2024-05-20 SDOH — ECONOMIC STABILITY: FOOD INSECURITY: WITHIN THE PAST 12 MONTHS, YOU WORRIED THAT YOUR FOOD WOULD RUN OUT BEFORE YOU GOT MONEY TO BUY MORE.: NEVER TRUE

## 2024-05-20 SDOH — ECONOMIC STABILITY: INCOME INSECURITY: HOW HARD IS IT FOR YOU TO PAY FOR THE VERY BASICS LIKE FOOD, HOUSING, MEDICAL CARE, AND HEATING?: NOT HARD AT ALL

## 2024-05-20 ASSESSMENT — PATIENT HEALTH QUESTIONNAIRE - PHQ9
SUM OF ALL RESPONSES TO PHQ QUESTIONS 1-9: 0
SUM OF ALL RESPONSES TO PHQ9 QUESTIONS 1 & 2: 0
1. LITTLE INTEREST OR PLEASURE IN DOING THINGS: NOT AT ALL
2. FEELING DOWN, DEPRESSED OR HOPELESS: NOT AT ALL

## 2024-05-20 ASSESSMENT — ANXIETY QUESTIONNAIRES
4. TROUBLE RELAXING: NOT AT ALL
7. FEELING AFRAID AS IF SOMETHING AWFUL MIGHT HAPPEN: NOT AT ALL
IF YOU CHECKED OFF ANY PROBLEMS ON THIS QUESTIONNAIRE, HOW DIFFICULT HAVE THESE PROBLEMS MADE IT FOR YOU TO DO YOUR WORK, TAKE CARE OF THINGS AT HOME, OR GET ALONG WITH OTHER PEOPLE: NOT DIFFICULT AT ALL
5. BEING SO RESTLESS THAT IT IS HARD TO SIT STILL: NOT AT ALL
1. FEELING NERVOUS, ANXIOUS, OR ON EDGE: NOT AT ALL
2. NOT BEING ABLE TO STOP OR CONTROL WORRYING: NOT AT ALL
6. BECOMING EASILY ANNOYED OR IRRITABLE: NOT AT ALL
GAD7 TOTAL SCORE: 0
3. WORRYING TOO MUCH ABOUT DIFFERENT THINGS: NOT AT ALL

## 2024-05-20 ASSESSMENT — ENCOUNTER SYMPTOMS
BLOOD IN STOOL: 0
SHORTNESS OF BREATH: 1

## 2024-05-20 NOTE — PROGRESS NOTES
Abdirashid Bee M.D.  [place] [address]  Phone: [phone]  Fax: [phone]    Foot Swelling   The pain is present in the right lower leg, left lower leg, right ankle, right foot, right toes, left ankle, left foot and left toes. This is a new problem. The current episode started more than 1 month ago. There has been no history of extremity trauma. The problem occurs constantly. The problem has been unchanged. Exacerbated by: Nothing.        Klaus Cochran is a 88 y.o. White (non-) male.     Current Outpatient Medications   Medication Sig Dispense Refill    meloxicam (MOBIC) 15 MG tablet Take 1 tablet by mouth daily      polyethylene glycol (GLYCOLAX) 17 GM/SCOOP powder Take 17 g by mouth daily      traMADol (ULTRAM) 50 MG tablet Take 1 tablet by mouth every 6 hours as needed for Pain.      lidocaine (LIDODERM) 5 % Place 2 patches onto the skin daily aply to low back      azelastine (ASTELIN) 0.1 % nasal spray 1 spray by Nasal route 2 times daily Use in each nostril as directed (Patient taking differently: 1 spray by Nasal route 2 times daily) 60 mL 11    latanoprost (XALATAN) 0.005 % ophthalmic solution Apply 1 drop to eye daily      nitroGLYCERIN (NITROSTAT) 0.4 MG SL tablet Place 1 tablet under the tongue every 5 minutes as needed for Chest pain      loratadine (CLARITIN) 10 MG tablet Take 1 tablet by mouth daily (Patient not taking: Reported on 5/20/2024) 90 tablet 3    lisinopril (PRINIVIL;ZESTRIL) 20 MG tablet Take 1 tablet by mouth daily (Patient not taking: Reported on 3/27/2024) 90 tablet 0    aspirin 81 MG EC tablet Take 1 tablet by mouth daily (Patient not taking: Reported on 3/25/2024)       No current facility-administered medications for this visit.     Allergies   Allergen Reactions    Bimatoprost Anaphylaxis     And skinned peeled    Benzalkonium Other (See Comments)    Iodine Hives     Past Medical History:   Diagnosis

## 2024-06-06 ENCOUNTER — OFFICE VISIT (OUTPATIENT)
Dept: INTERNAL MEDICINE CLINIC | Facility: CLINIC | Age: 89
End: 2024-06-06
Payer: MEDICARE

## 2024-06-06 VITALS
DIASTOLIC BLOOD PRESSURE: 68 MMHG | OXYGEN SATURATION: 92 % | TEMPERATURE: 97.3 F | HEIGHT: 68 IN | SYSTOLIC BLOOD PRESSURE: 116 MMHG | HEART RATE: 56 BPM | BODY MASS INDEX: 20.16 KG/M2 | WEIGHT: 133 LBS | RESPIRATION RATE: 16 BRPM

## 2024-06-06 DIAGNOSIS — R60.0 LEG EDEMA: ICD-10-CM

## 2024-06-06 DIAGNOSIS — N18.31 STAGE 3A CHRONIC KIDNEY DISEASE (HCC): ICD-10-CM

## 2024-06-06 DIAGNOSIS — R06.09 DOE (DYSPNEA ON EXERTION): ICD-10-CM

## 2024-06-06 DIAGNOSIS — I50.21 ACUTE SYSTOLIC CONGESTIVE HEART FAILURE (HCC): ICD-10-CM

## 2024-06-06 DIAGNOSIS — I50.21 ACUTE SYSTOLIC CONGESTIVE HEART FAILURE (HCC): Primary | ICD-10-CM

## 2024-06-06 LAB
ANION GAP SERPL CALC-SCNC: 11 MMOL/L (ref 9–18)
BUN SERPL-MCNC: 20 MG/DL (ref 8–23)
CALCIUM SERPL-MCNC: 8.9 MG/DL (ref 8.8–10.2)
CHLORIDE SERPL-SCNC: 101 MMOL/L (ref 98–107)
CO2 SERPL-SCNC: 23 MMOL/L (ref 20–28)
CREAT SERPL-MCNC: 1.82 MG/DL (ref 0.8–1.3)
GLUCOSE SERPL-MCNC: 94 MG/DL (ref 70–99)
NT PRO BNP: ABNORMAL PG/ML (ref 0–450)
POTASSIUM SERPL-SCNC: 4.1 MMOL/L (ref 3.5–5.1)
SODIUM SERPL-SCNC: 135 MMOL/L (ref 136–145)

## 2024-06-06 PROCEDURE — G2211 COMPLEX E/M VISIT ADD ON: HCPCS | Performed by: INTERNAL MEDICINE

## 2024-06-06 PROCEDURE — 99214 OFFICE O/P EST MOD 30 MIN: CPT | Performed by: INTERNAL MEDICINE

## 2024-06-06 PROCEDURE — G8420 CALC BMI NORM PARAMETERS: HCPCS | Performed by: INTERNAL MEDICINE

## 2024-06-06 PROCEDURE — 1123F ACP DISCUSS/DSCN MKR DOCD: CPT | Performed by: INTERNAL MEDICINE

## 2024-06-06 PROCEDURE — 1036F TOBACCO NON-USER: CPT | Performed by: INTERNAL MEDICINE

## 2024-06-06 PROCEDURE — G8428 CUR MEDS NOT DOCUMENT: HCPCS | Performed by: INTERNAL MEDICINE

## 2024-06-06 RX ORDER — FUROSEMIDE 20 MG/1
20 TABLET ORAL DAILY
Qty: 30 TABLET | Refills: 0 | Status: SHIPPED | OUTPATIENT
Start: 2024-06-06

## 2024-06-06 NOTE — PROGRESS NOTES
Abdirashid Bee M.D.  Internal Medicine  [address] [address]  Phone: [phone]  Fax: [phone]    \[facility] [place] [facility]\""     Klaus Cochran is a 88 y.o. White (non-) male.     Lab Results   Component Value Date/Time     05/20/2024 02:24 PM    K 4.0 05/20/2024 02:24 PM     05/20/2024 02:24 PM    CO2 23 05/20/2024 02:24 PM    BUN 21 05/20/2024 02:24 PM    CREATININE 1.79 05/20/2024 02:24 PM    GLUCOSE 70 05/20/2024 02:24 PM    CALCIUM 9.3 05/20/2024 02:24 PM    LABGLOM 36 05/20/2024 02:24 PM    LABGLOM 34 03/25/2024 02:43 PM      BNP 5/20/24 = 46k.       Current Outpatient Medications   Medication Sig Dispense Refill    furosemide (LASIX) 20 MG tablet Take 1 tablet by mouth daily 30 tablet 0    polyethylene glycol (GLYCOLAX) 17 GM/SCOOP powder Take 17 g by mouth daily      traMADol (ULTRAM) 50 MG tablet Take 1 tablet by mouth every 6 hours as needed for Pain.      lidocaine (LIDODERM) 5 % Place 2 patches onto the skin daily aply to low back      azelastine (ASTELIN) 0.1 % nasal spray 1 spray by Nasal route 2 times daily Use in each nostril as directed (Patient taking differently: 1 spray by Nasal route 2 times daily) 60 mL 11    loratadine (CLARITIN) 10 MG tablet Take 1 tablet by mouth daily (Patient not taking: Reported on 5/20/2024) 90 tablet 3    lisinopril (PRINIVIL;ZESTRIL) 20 MG tablet Take 1 tablet by mouth daily (Patient not taking: Reported on 3/27/2024) 90 tablet 0    aspirin 81 MG EC tablet Take 1 tablet by mouth daily (Patient not taking: Reported on 3/25/2024)      latanoprost (XALATAN) 0.005 % ophthalmic solution Apply 1 drop to eye daily      nitroGLYCERIN (NITROSTAT) 0.4 MG SL tablet Place 1 tablet under the tongue every 5 minutes as needed for Chest pain       No current facility-administered medications for this visit.     Allergies   Allergen Reactions    Bimatoprost Anaphylaxis     And skinned peeled    Benzalkonium Other (See Comments)

## 2024-07-01 DIAGNOSIS — N18.31 STAGE 3A CHRONIC KIDNEY DISEASE (HCC): ICD-10-CM

## 2024-07-01 DIAGNOSIS — R60.0 LEG EDEMA: ICD-10-CM

## 2024-07-01 DIAGNOSIS — I50.21 ACUTE SYSTOLIC CONGESTIVE HEART FAILURE (HCC): ICD-10-CM

## 2024-07-01 DIAGNOSIS — R06.09 DOE (DYSPNEA ON EXERTION): ICD-10-CM

## 2024-07-02 RX ORDER — FUROSEMIDE 20 MG
20 TABLET ORAL DAILY
Qty: 30 TABLET | Refills: 0 | OUTPATIENT
Start: 2024-07-02

## 2024-07-02 NOTE — TELEPHONE ENCOUNTER
I reviewed the pts chart.  Last appt: 6/6/24 (Acute Care)  Next appt: No future appt   Rx Last Sent: 6/6/24 #30 NR; should run out 7/6/24.   The pt will need an appt to obtain refills

## 2024-07-24 ENCOUNTER — TELEPHONE (OUTPATIENT)
Age: 89
End: 2024-07-24

## 2024-07-24 ENCOUNTER — OFFICE VISIT (OUTPATIENT)
Age: 89
End: 2024-07-24
Payer: MEDICARE

## 2024-07-24 VITALS
SYSTOLIC BLOOD PRESSURE: 100 MMHG | DIASTOLIC BLOOD PRESSURE: 50 MMHG | BODY MASS INDEX: 19.7 KG/M2 | WEIGHT: 130 LBS | HEIGHT: 68 IN | HEART RATE: 55 BPM

## 2024-07-24 DIAGNOSIS — I48.19 PERSISTENT ATRIAL FIBRILLATION (HCC): Primary | ICD-10-CM

## 2024-07-24 DIAGNOSIS — I35.0 NONRHEUMATIC AORTIC VALVE STENOSIS: ICD-10-CM

## 2024-07-24 DIAGNOSIS — I34.0 NONRHEUMATIC MITRAL VALVE REGURGITATION: ICD-10-CM

## 2024-07-24 DIAGNOSIS — I50.22 CHRONIC SYSTOLIC HEART FAILURE (HCC): ICD-10-CM

## 2024-07-24 DIAGNOSIS — R60.0 LEG EDEMA: ICD-10-CM

## 2024-07-24 DIAGNOSIS — N18.31 STAGE 3A CHRONIC KIDNEY DISEASE (HCC): ICD-10-CM

## 2024-07-24 DIAGNOSIS — E78.2 MIXED HYPERLIPIDEMIA: ICD-10-CM

## 2024-07-24 DIAGNOSIS — I25.810 CORONARY ARTERY DISEASE INVOLVING CORONARY BYPASS GRAFT OF NATIVE HEART WITHOUT ANGINA PECTORIS: ICD-10-CM

## 2024-07-24 PROCEDURE — 1036F TOBACCO NON-USER: CPT | Performed by: INTERNAL MEDICINE

## 2024-07-24 PROCEDURE — G8420 CALC BMI NORM PARAMETERS: HCPCS | Performed by: INTERNAL MEDICINE

## 2024-07-24 PROCEDURE — 99214 OFFICE O/P EST MOD 30 MIN: CPT | Performed by: INTERNAL MEDICINE

## 2024-07-24 PROCEDURE — 93000 ELECTROCARDIOGRAM COMPLETE: CPT | Performed by: INTERNAL MEDICINE

## 2024-07-24 PROCEDURE — 1123F ACP DISCUSS/DSCN MKR DOCD: CPT | Performed by: INTERNAL MEDICINE

## 2024-07-24 PROCEDURE — G8427 DOCREV CUR MEDS BY ELIG CLIN: HCPCS | Performed by: INTERNAL MEDICINE

## 2024-07-24 RX ORDER — FUROSEMIDE 20 MG/1
20 TABLET ORAL AS NEEDED
Qty: 30 TABLET | Refills: 3 | Status: SHIPPED | OUTPATIENT
Start: 2024-07-24

## 2024-07-24 ASSESSMENT — ENCOUNTER SYMPTOMS: SHORTNESS OF BREATH: 0

## 2024-07-24 NOTE — PROGRESS NOTES
[facility], [address], [address]  [place] [address]  PHONE: [phone]    Klaus Cochran  1935      SUBJECTIVE:   Klaus Cochran is a 88 y.o. male seen for a follow up visit regarding the following:     Chief Complaint   Patient presents with    Results     echo    Congestive Heart Failure    Atrial Fibrillation    Coronary Artery Disease       HPI:      Coronary Artery Disease:  Patient denies any recent angina.  Notes compliance with medical therapy.  No recent NTG use.      CHF: Recent echo with moderate LV dysfunction.  Occasional edema.  No PND orthopnea.  Very debilitated due to his back pain.  Now only able to ambulate short distances with a walker.  Recent echo   Echo (7/1/24):  EF 35-40%.  Mild AS.  MG 16.  Severe MR.  Severe TR.  RVSP 94.  Moderately reduced RV systolic function.      Persistent atrial fibrillation: Denies palpitation or tachycardia.  No neurologic symptoms consistent with TIA or stroke.     Hypertension:  Ambulatory BP readings have been controlled.  Patient reports compliance with medical therapy without side effects.       Hyperlipidemia:   Unable to take statins.      Back pain: Remains his largest limitation.  Poorly controlled.     Weight loss: Weight stable.      CKD 1.82         Past Medical History, Past Surgical History, Family history, Social History, and Medications were all reviewed with the patient today and updated as necessary.           Current Outpatient Medications:     furosemide (LASIX) 20 MG tablet, Take 1 tablet by mouth as needed (edema/fluid), Disp: 30 tablet, Rfl: 3    polyethylene glycol (GLYCOLAX) 17 GM/SCOOP powder, Take 17 g by mouth daily, Disp: , Rfl:     traMADol (ULTRAM) 50 MG tablet, Take 1 tablet by mouth every 6 hours as needed for Pain., Disp: , Rfl:     lidocaine (LIDODERM) 5 %, Place 2 patches onto the skin daily aply to low back, Disp: , Rfl:     aspirin 81 MG EC tablet, Take 1 tablet by mouth daily, Disp: , Rfl:

## 2024-07-24 NOTE — TELEPHONE ENCOUNTER
Called [facility], needed clarification on Lasix. How many should patient take per day. Pharmacist informed patient to take lasix once a day as needed//miguelab

## 2024-11-13 ENCOUNTER — OFFICE VISIT (OUTPATIENT)
Dept: INTERNAL MEDICINE CLINIC | Facility: CLINIC | Age: 89
End: 2024-11-13

## 2024-11-13 VITALS
BODY MASS INDEX: 19.46 KG/M2 | HEART RATE: 60 BPM | TEMPERATURE: 97.4 F | WEIGHT: 128.4 LBS | OXYGEN SATURATION: 100 % | DIASTOLIC BLOOD PRESSURE: 64 MMHG | HEIGHT: 68 IN | SYSTOLIC BLOOD PRESSURE: 126 MMHG

## 2024-11-13 DIAGNOSIS — I50.22 CHRONIC SYSTOLIC HEART FAILURE (HCC): ICD-10-CM

## 2024-11-13 DIAGNOSIS — N18.31 STAGE 3A CHRONIC KIDNEY DISEASE (HCC): ICD-10-CM

## 2024-11-13 DIAGNOSIS — I34.0 SEVERE MITRAL REGURGITATION: Primary | ICD-10-CM

## 2024-11-13 ASSESSMENT — PATIENT HEALTH QUESTIONNAIRE - PHQ9
SUM OF ALL RESPONSES TO PHQ QUESTIONS 1-9: 0
2. FEELING DOWN, DEPRESSED OR HOPELESS: NOT AT ALL
SUM OF ALL RESPONSES TO PHQ9 QUESTIONS 1 & 2: 0
SUM OF ALL RESPONSES TO PHQ QUESTIONS 1-9: 0
1. LITTLE INTEREST OR PLEASURE IN DOING THINGS: NOT AT ALL
SUM OF ALL RESPONSES TO PHQ QUESTIONS 1-9: 0
SUM OF ALL RESPONSES TO PHQ QUESTIONS 1-9: 0

## 2024-11-13 NOTE — PROGRESS NOTES
[facility]  [address] [place] [address]  Dept: [phone]    Physical Therapy Initial Evaluation     Patient Name: Ben HOLCOMB Ming Lopez  Referring Doctor: Milka Reynoso MD  Date of Service: 7/20/2021    Medicare Onset Date: 2018  Medicare SOC Date: 7/19/2021  Medicare Prior Hospitalizations: March 2021  Medicare Certification Period: 7/19/2021 THRU 10/18/2021    [id number]    Primary Medical Diagnosis: Hepatocellular carcinoma (CMS/HCC) (HCC) [C22.0]     Treatment Diagnosis.   Generalized weakness, balance impairments, difficulty with functional transfers, fall risk, poor tolerance to functional activities.     Visit Number: 1    Subjective:  Patient is a 70-year-old male presents physical therapy for cancer rehabilitation.  Patient was diagnosed with hepatocellular carcinoma in November 2018.  Patient states he is generally very tired as he could sleep all day.  Patient states he does need help getting out of bed in the morning and then has at the side of the bed for about 30 minutes due to balance impairments before he gets up to get ready for the day.  Patient states his wife does help him get dressed.  Patient states he wears pull-ups because he cannot always get to the bathroom quick enough.  Patient denies any falls in the last 6 months.  Patient has been using an upright Udrive 4 wheeled walker when he is outside of the house, patient states that is a small enough that he does not use any assistive device inside the house.  Patient states that stairs have been becoming increasingly difficult and takes them very slowly.    Patient reports having an abdominal drain on the right and Chemo-Port on the right       Prior Function  Level of [place]: Independent with ADLs and functional transfers, Independent with homemaking with ambulation  Lived With: Spouse  Receives Help From: Family  Driving: No  Current License:  Abdirashid Bee M.D.  Internal Medicine  [address] [address]  Phone: [phone]  Fax: [phone]    \[facility]\""     Klaus Cochran is a 89 y.o. White (non-) male.     Patient with h/o chronic systolic CHF (with diuretic treatment limited by CKD) and severe nonrheumatic MR.  Follows with Cardiology, Dr. Waggoner, note from 7/24/24 reviewed.  Noted that Cardiology reports a poor prognosis, particularly due to the severe MR and CHF.     Patient is in to discuss hospice referral.  He is losing weight.  Has severe GUTIERREZ and severe chronic back pain.     Current Outpatient Medications   Medication Sig Dispense Refill    furosemide (LASIX) 20 MG tablet Take 1 tablet by mouth as needed (edema/fluid) 30 tablet 3    polyethylene glycol (GLYCOLAX) 17 GM/SCOOP powder Take 17 g by mouth daily      traMADol (ULTRAM) 50 MG tablet Take 1 tablet by mouth every 6 hours as needed for Pain.      lidocaine (LIDODERM) 5 % Place 2 patches onto the skin daily aply to low back      aspirin 81 MG EC tablet Take 1 tablet by mouth daily      latanoprost (XALATAN) 0.005 % ophthalmic solution Apply 1 drop to eye daily      nitroGLYCERIN (NITROSTAT) 0.4 MG SL tablet Place 1 tablet under the tongue every 5 minutes as needed for Chest pain       No current facility-administered medications for this visit.     Allergies   Allergen Reactions    Bimatoprost Anaphylaxis     And skinned peeled    Benzalkonium Other (See Comments)    Iodine Hives     Past Medical History:   Diagnosis Date    Anemia 5/21/2013    Bruit 7/13/2016    CAD (coronary artery disease) 1999    CABG---- no mi/stents--- followed by dr. waggoner    Carotid artery stenosis without cerebral infarction 7/13/2016    1.  Right carotid stent August 2015.      Chronic systolic heart failure (HCC) 7/13/2016    Depression 5/21/2013    GERD (gastroesophageal reflux disease)     occ-- no meds    Glaucoma     HLD (hyperlipidemia)     HTN  No  Vocational Status: Retired  Prior Function Comments: indep in some ADLs. requires assistance for bathing and dressing.     Past Medical History:   Diagnosis Date   • A-fib (CMS/HCC) (HCC)    • Allergy    • Anxiety    • Arthritis    • Asthma    • Blindness of right eye    • BPH (benign prostatic hyperplasia)    • Cardiovascular disease    • Cirrhosis (CMS/HCC) (HCC)     with possible liver mass by MRI 5/18, rec repeat in 8/2018   • Complication of anesthesia    • Congestive heart failure (CMS/HCC) (HCC) 9/20/2019   • COPD (chronic obstructive pulmonary disease) (CMS/HCC) (HCC)    • Depression    • Endocrine disorder    • Gallstones    • Gastritis    • GERD (gastroesophageal reflux disease)    • Glaucoma    • Hearing loss     bilateral hearing aids   • Hemorrhoids    • Hepatitis C     Hep C, 2016 remission, complicated by cirrhosis.  MRI abd 5/30/18, rec 3 month follow up for focus of enhancement right and left hepatic lobe junction   • Hernia, abdominal    • High blood pressure    • IBS (irritable bowel syndrome)    • Infectious viral hepatitis    • Jaundice    • Kidney stones    • Obstructive sleep apnea syndrome    • Periodic limb movement disorder    • Persistent hypersomnia    • Persistent insomnia    • Pneumonia    • PONV (postoperative nausea and vomiting)    • Type 2 diabetes mellitus (CMS/HCC) (HCC)    • Urinary incontinence    • Wears partial dentures          Current Outpatient Medications:   •  cefuroxime (CEFTIN) 500 mg tablet, Take 500 mg by mouth 2 (two) times a day X 10 days, Disp: , Rfl:   •  metroNIDAZOLE (FLAGYL) 500 mg tablet, Take 500 mg by mouth 3 (three) times a day x21 days (started 7/6/21), Disp: , Rfl:   •  morphine (MS CONTIN) 15 mg 12 hr tablet, TAKE ONE TABLET BY MOUTH TWICE A DAY FOR PAIN, Disp: , Rfl:   •  lidocaine-prilocaine (EMLA) cream, Apply thick layer to intact skin over port 1 hour prior to procedure. Cover with occlusive dressing., Disp: 30 g, Rfl: 2  •  HYDROmorphone  (DILAUDID) 2 mg tablet, Take 2-4 mg by mouth every 6 (six) hours as needed  , Disp: , Rfl:   •  sildenafiL (VIAGRA) 100 mg tablet, Take 100 mg by mouth as needed for erectile dysfunction Up to 4 times monthly, Disp: , Rfl:   •  fluoride, sodium, (DENTAGEL) 1.1 % gel dental gel, See administration instructions Apply small amount to toothbrush in place of regular toothpaste. Brush for 2 minutes in the morning and evening., Disp: , Rfl:   •  polyethylene glycol (Miralax) 17 gram/dose powder, Take 17 g by mouth daily, Disp: , Rfl:   •  brimonidine (ALPHAGAN) 0.2 % ophthalmic solution, 1 drop 2 (two) times a day, Disp: , Rfl:   •  carboxymethylcellulose (REFRESH PLUS) 0.5 % dropperette, Administer 1 drop into both eyes every 3 (three) hours Indications: dry eye  , Disp: , Rfl:   •  prochlorperazine (COMPAZINE) 10 mg tablet, Take 1 tablet (10 mg total) by mouth every 6 (six) hours as needed for nausea or vomiting, Disp: 30 tablet, Rfl: 5  •  artificial saliva (YERBA GLADYS AND LYTES) aerosol,spray spray, Apply 2 sprays to the mouth or throat as needed  , Disp: , Rfl:   •  fluticasone propionate (FLONASE) 50 mcg/actuation nasal spray, Administer 2 sprays into each nostril daily  , Disp: , Rfl:   •  naloxone HCl (NARCAN NASL), Administer into affected nostril(s) as needed, Disp: , Rfl:   •  traZODone (DESYREL) 100 mg tablet, Take 100 mg by mouth nightly  , Disp: , Rfl:   •  insulin glargine (Lantus Solostar U-100 Insulin) 100 unit/mL (3 mL) insulin pen injection pen, Inject 24 Units under the skin every morning  , Disp: , Rfl:   •  carvediloL (COREG) 3.125 mg tablet, Take 1 tablet (3.125 mg total) by mouth 2 (two) times a day with meals, Disp: 180 tablet, Rfl: 0  •  diclofenac sodium (VOLTAREN) 1 % gel, Apply 4 g topically 4 (four) times a day  , Disp: , Rfl:   •  cetirizine (ZyrTEC) 10 mg tablet, Take 10 mg by mouth daily, Disp: , Rfl:   •  loperamide (Imodium A-D) 2 mg tablet, Take 2 tablets with first loose stool of the  day, then up to 8 tablets daily (Patient not taking: Reported on 4/15/2021 ), Disp: 80 tablet, Rfl: 2  •  buPROPion SR (WELLBUTRIN SR) 100 mg 12 hr tablet, Take 1 tablet (100 mg total) by mouth daily, Disp: 90 tablet, Rfl: 1  •  lactulose (GENERLAC) 10 gram/15 mL solution, Take 15 mL (10 g total) by mouth daily, Disp: 473 mL, Rfl: 2  •  terazosin (HYTRIN) 5 mg capsule, Take 1 capsule (5 mg total) by mouth 2 (two) times a day, Disp: 180 capsule, Rfl: 2  •  blood-glucose meter (ACCU-CHEK ADELE PLUS METER) [facility], Use to test blood sugars 3 times daily (E11.65, non insulin depedent) AccuChek Adele plus, meter is 8 years old, Disp: 1 each, Rfl: 0  •  lisinopril (PRINIVIL,ZESTRIL) 20 mg tablet, Take 1 tablet (20 mg total) by mouth daily, Disp: 90 tablet, Rfl: 2  •  furosemide (LASIX) 20 mg tablet, Take 1 tablet (20 mg total) by mouth 2 (two) times a day., Disp: 180 tablet, Rfl: 2  •  lansoprazole (PREVACID) 30 mg capsule, Take 30 mg by mouth daily , Disp: , Rfl:   •  liraglutide (VICTOZA 2-JASE) 0.6 mg/0.1 mL (18 mg/3 mL) injection, Inject 1.8 mg under the skin daily  , Disp: , Rfl:   •  sodium chloride (SALINE NASAL) 0.65 % nasal spray, Administer 1 spray into each nostril as needed for congestion or rhinitis , Disp: , Rfl:   •  pramoxine-menthol (GOLD BOND MEDICATED ANTI-ITCH) 1-1 % cream, Apply topically as needed  , Disp: , Rfl:   •  latanoprost (XALATAN) 0.005 % ophthalmic solution, Administer 1 drop into both eyes nightly  , Disp: 10, Rfl: 0    Metformin, Adhesive tape-silicones, Interferon violetta-2a, Latex, Milk, Mometasone, Mometasone furoate, Omeprazole, Ondansetron hcl, Pepper (genus capsicum), Primidone, Ribavirin, Rosuvastatin, Sorafenib, Spironolactone, Statins-hmg-coa reductase inhibitors, Interferon violetta (human leuk. derived), Pantoprazole, Peginterferon violetta-2b, and Penicillins    Social History     Socioeconomic History   • Marital status:      Spouse name: Not on file   • Number of children: Not on  file   • Years of education: Not on file   • Highest education level: Not on file   Occupational History   • Not on file   Tobacco Use   • Smoking status: Never Smoker   • Smokeless tobacco: Never Used   Substance and Sexual Activity   • Alcohol use: No   • Drug use: No   • Sexual activity: Defer   Other Topics Concern   • Not on file   Social History Narrative   • Not on file     Social Determinants of Health     Financial Resource Strain:    • Difficulty of Paying Living Expenses:    Food Insecurity:    • Worried About Running Out of Food in the Last Year:    • Ran Out of Food in the Last Year:    Transportation Needs:    • Lack of Transportation (Medical):    • Lack of Transportation (Non-Medical):    Physical Activity:    • Days of Exercise per Week:    • Minutes of Exercise per Session:    Stress:    • Feeling of Stress :    Social Connections:    • Frequency of Communication with Friends and Family:    • Frequency of Social Gatherings with Friends and Family:    • Attends [employer] Services:    • Active Member of Clubs or Organizations:    • Attends Club or Organization Meetings:    • Marital Status:    Intimate Partner Violence:    • Fear of Current or Ex-Partner:    • Emotionally Abused:    • Physically Abused:    • Sexually Abused:        Home Living  Home Living Comments: pt lives in [place] with his wife. split level home. using a UDrive upright 4WW when outisde of the house.        Cisneros Fall Risk  History of Falling: No  Secondary Diagnosis: Yes  Ambulatory Aids: Crutches/walker/cane  Intravenous Therapy/Heparin/Saline Lock: No  Gait/Transferring: Weak  Mental Status: Oriented to own ability  Cisneros Fall Risk Score: 40  Cisneros Fall Risk Score: 40  Fall Risk Interventions: close SBA-CGA and gait belt will be utilized to reduce risk of falls during dynamic activities.       Pain:  Pain Assessment Scale  Pain Scale: 0-10  0-10 Pain Score: 0 - No pain    Activities limited by Patient's complaint:   1)  walking    Patient's goals physical therapy:  1) get stronger      Objective:    Observation:  Patient is a 70-year-old male, ambulating using a upright 4 wheeled walker.  Abdominal drain on the right.  Heavy use of upper extremities for sit to stand transfers    ROM:   Range of motion of bilateral lower extremities grossly within functional limits for functional transfers and ambulation around the room    Strength testing:   Strength of bilateral lower extremities grossly WFL, 4+/5 using MMT except:  Bilateral hip flexion: 4/5  Bilateral hip abduction: 3+/5    Strength of bilateral upper extremities grossly WFL, 4+/5 throughout using MMT except:  Bilateral shoulder flexion: 4 -/5  Bilateral elbow extension: 4 -/5    Special Tests:   FOTO outcome measure score: 35/100    30 sec sit to stand: 6 repetitions    Standing balance with narrow base of support: 30 seconds  Eyes closed: 15 seconds    Education:   Patient was educated in plan of care and goals physical therapy.  Patient was educated in [employer] recommendations for 150 minutes of physical activity a week, 20 minutes/day of increased heart rate activity.  Discussed ambulating more throughout the house.  Patient was educated in home exercise program including the following:    Access Code: [email or web address]  URL: [email or web address]  Date: 07/19/2021  Prepared by: Guerline Garcia    Exercises  Sit to Stand - 2 x daily - 7 x weekly - 2 sets - 10 reps  Standing March with Counter Support - 2 x daily - 7 x weekly - 2 sets - 10 reps  Heel rises with counter support - 2 x daily - 7 x weekly - 2 sets - 10 reps  Standing Tandem Balance with Counter Support - 2 x daily - 7 x weekly - 2 sets - 30 seconds hold            Time Calculation  Start Time: 1400  Stop Time: 1445  Time Calculation (min): 45 min     Therapeutic Interventions (Time Spent in Minutes)  Therapeutic Exercise: 10     PT Untimed Charges - Quick List (Time Spent in Minutes)  PT  Eval Moderate Complexity: 35       Initial Treatment:  Evaluation Time Duration: 35 minutes of moderate complexity due to personal factors, co-morbidities, and evolving clinical presentation. Evaluation consisted of subjective history, objective test and measures, special testing, and education.   Treatment Time Duration: 10 minutes of Therapeutic exercise for performance of HEP as outlined above.       Rehab Goals/Potential/Assessment/Plan:  Patient is a 70-year-old male presents physical therapy for cancer rehabilitation as he was diagnosed with hepatocellular carcinoma in November 2018.  Patient presents with generalized deconditioning and weakness of bilateral upper extremities and lower extremities, balance impairments, gait abnormalities, poor activity tolerance, and requires assistance with ADLs at home.  Patient benefit from skilled physical therapy to improve safety at home in the community, improve independence with ADLs, and reduce risk of falls.  Patient is in agreement with plan of care at this time wishes to initiate therapy services.      Short-Term Goals:  Short Term PT Goal 1: Patient will be independent and compliant with HEP within 2-3 weeks.    Short Term PT Goal 2: Patient will improve 30 second sit<>stand > 7 reps within 4-6 weeks.    Long-Term Goals:  Long Term PT Goal 1: Patient will improve cardiovascular endurance to tolerate completion of 6 minute walk test within 6-8 weeks.    Long Term PT Goal 2: patient will improve LE strength to 4+/5 throughout using MMT within 8-10 weeks.        Prognosis  Assessment: Decreased UE strength, Decreased LE strength  Prognosis: Fair    Plan  Treatment Interventions: Functional transfer training, Gait training, Stair training, Balance training, Endurance training, Therapeutic activities, Therapeutic exercises, Manual therapy, Modalities, Neuromuscular re-education  PT Frequency: 1-2x/wk  Treatment Duration : 30-45 minutes, 1-2x/week for 10-12 weeks.  Plan  Comment: PT 1-2x/week for LE and UE strengthening, balance , gait training; perform 6 min walk test as able.         Additional Comments:     Thank you for allowing us to share in the care of this patient.  If you have any questions, recommendations, or further concerns regarding this patient, please feel free to contact me at [phone].  Signed by: Guerline Garcia, PT  7/20/2021  7:17 AM    * I have reviewed the plan of care and certify a continuing need for medically necessary services

## 2024-11-13 NOTE — ACP (ADVANCE CARE PLANNING)
Advance Care Planning   The patient has the following advanced directives on file:  Advance Directives       Power of  Living Will ACP-Advance Directive ACP-Power of     Not on File Not on File Not on File Not on File            The patient has appointed the following active healthcare agents:    Primary Decision Maker: Vanessa Ramsey - Spouse - [phone]    The Patient has the following current code status:    Code Status: Full Code    Visit Documentation:  I discussed Advance Care Planning with Klaus Cochran today which included the importance of making their choices for care and treatment in the case of a health event that adversely affects their decision-making abilities. He has not completed the Advance Care Directives. He does not have an active health care agent at this time.  Klaus Cochran was encouraged to complete the declaration forms and provide a signed copy of his medical records.  I advised patient we would continue this discussion at future visits.     Daysi Escalona  11/13/2024